# Patient Record
Sex: MALE | Race: WHITE | Employment: OTHER | URBAN - METROPOLITAN AREA
[De-identification: names, ages, dates, MRNs, and addresses within clinical notes are randomized per-mention and may not be internally consistent; named-entity substitution may affect disease eponyms.]

---

## 2017-03-06 PROCEDURE — 82043 UR ALBUMIN QUANTITATIVE: CPT | Performed by: INTERNAL MEDICINE

## 2017-03-06 PROCEDURE — 82570 ASSAY OF URINE CREATININE: CPT | Performed by: INTERNAL MEDICINE

## 2017-04-20 PROCEDURE — 87493 C DIFF AMPLIFIED PROBE: CPT | Performed by: INTERNAL MEDICINE

## 2017-04-20 PROCEDURE — 87046 STOOL CULTR AEROBIC BACT EA: CPT | Performed by: INTERNAL MEDICINE

## 2017-04-20 PROCEDURE — 87427 SHIGA-LIKE TOXIN AG IA: CPT | Performed by: INTERNAL MEDICINE

## 2017-04-20 PROCEDURE — 87269 GIARDIA AG IF: CPT | Performed by: INTERNAL MEDICINE

## 2017-04-20 PROCEDURE — 87045 FECES CULTURE AEROBIC BACT: CPT | Performed by: INTERNAL MEDICINE

## 2017-04-20 PROCEDURE — 87177 OVA AND PARASITES SMEARS: CPT | Performed by: INTERNAL MEDICINE

## 2017-04-20 PROCEDURE — 87015 SPECIMEN INFECT AGNT CONCNTJ: CPT | Performed by: INTERNAL MEDICINE

## 2017-04-20 PROCEDURE — 87209 SMEAR COMPLEX STAIN: CPT | Performed by: INTERNAL MEDICINE

## 2017-04-24 PROCEDURE — 86334 IMMUNOFIX E-PHORESIS SERUM: CPT | Performed by: PHYSICIAN ASSISTANT

## 2017-04-24 PROCEDURE — 84165 PROTEIN E-PHORESIS SERUM: CPT | Performed by: PHYSICIAN ASSISTANT

## 2017-04-24 PROCEDURE — 82784 ASSAY IGA/IGD/IGG/IGM EACH: CPT | Performed by: PHYSICIAN ASSISTANT

## 2017-04-24 PROCEDURE — 83883 ASSAY NEPHELOMETRY NOT SPEC: CPT | Performed by: PHYSICIAN ASSISTANT

## 2017-07-06 ENCOUNTER — TELEPHONE (OUTPATIENT)
Dept: RADIATION ONCOLOGY | Facility: HOSPITAL | Age: 82
End: 2017-07-06

## 2017-07-06 NOTE — TELEPHONE ENCOUNTER
Contacted Dr. Crum Modest office to inform them multiple attempts have been made to contact Mr. Licea with messages left to call the [facility] to schedule a consult with Dr. Micah Schmitt without success.   Informed office no further attempts would be made to

## 2017-08-01 ENCOUNTER — APPOINTMENT (OUTPATIENT)
Dept: RADIATION ONCOLOGY | Facility: HOSPITAL | Age: 82
End: 2017-08-01
Attending: RADIOLOGY
Payer: MEDICARE

## 2017-08-03 ENCOUNTER — OFFICE VISIT (OUTPATIENT)
Dept: RADIATION ONCOLOGY | Facility: HOSPITAL | Age: 82
End: 2017-08-03
Attending: RADIOLOGY
Payer: MEDICARE

## 2017-08-03 VITALS
DIASTOLIC BLOOD PRESSURE: 63 MMHG | SYSTOLIC BLOOD PRESSURE: 129 MMHG | RESPIRATION RATE: 18 BRPM | HEIGHT: 68 IN | WEIGHT: 207.63 LBS | BODY MASS INDEX: 31.47 KG/M2 | HEART RATE: 66 BPM

## 2017-08-03 PROCEDURE — 99212 OFFICE O/P EST SF 10 MIN: CPT

## 2017-08-03 RX ORDER — FENTANYL 25 UG/H
1 PATCH TRANSDERMAL
COMMUNITY
End: 2019-01-01

## 2017-08-03 RX ORDER — HYDROCODONE BITARTRATE AND ACETAMINOPHEN 5; 325 MG/1; MG/1
1 TABLET ORAL EVERY 4 HOURS PRN
COMMUNITY
End: 2019-01-01 | Stop reason: ALTCHOICE

## 2017-08-03 NOTE — PROGRESS NOTES
Nursing Consultation Note  Patient: Hawa Mccarthy  YOB: 1935  Age: 80year old  Radiation Oncologist: Dr. Azalia Méndez  Referring Physician: Ayaan Flores  Diagnosis:No diagnosis found.   Consult Date: 8/3/2017      Chemotherapy: no  Labs: Rfl: 2   GABAPENTIN 600 MG Oral Tab TAKE 2 TABLETS BY MOUTH IN THE MORNING THEN TAKE ONE TABLET IN THE AFTERNOON THEN TAKE 2 TABLETS AT DINNER TIME THEN TAKE ONE TABLET AT BEDT Disp: 360 tablet Rfl: 2   Nortriptyline HCl 25 MG Oral Cap TAKE TWO CAPSULES BY Disp:  Rfl:        Preferred Pharmacy:    [facility] [address] [phone], [phone]  [address]   [address] [place] [address]  Phone: [phone] Fax: [phone]    [facility] [address], [address]  No date: CATARACT  No date: CHOLECYSTECTOMY  7/9/2004: COLONOSCOPY      Comment: UGI 4/16/07 8/5/2014: COLONOSCOPY,BIOPSY N/A      Comment: Procedure: COLONOSCOPY, POSSIBLE BIOPSY,                POSSIBLE POLYPECTOMY 70549;  Surgeon: Leeroy Garcia use Yes  3.6 oz/week    6 Standard drinks or equivalent per week         Comment: weekends fri-sun; martini or [place]    Drug use: No    Sexual activity: Not on file     Other Topics Concern   None on file     Social History Narrative    Retired.  Bayron Valverde

## 2017-08-03 NOTE — PROGRESS NOTES
RADIATION ONCOLOGY NOTE    DATE OF VISIT: 8/3/2017    DIAGNOSIS :  79 yo with Metastatic castration resistant prostate cancer, currently in hospice. Dear Tom King and colleagues,    Thank you for asking us to see [facility].   He presents today after severa total) by mouth every morning. Disp: 90 capsule Rfl: 3   Oxybutynin Chloride ER 5 MG Oral Tablet 24 Hr Take 1 tablet (5 mg total) by mouth once daily.  Disp: 90 tablet Rfl: 3   tamsulosin HCl 0.4 MG Oral Cap Take 1 capsule (0.4 mg total) by mouth once daily of malignant neoplasm (2004); OTHER DISEASES; Other vitamin B12 deficiency anemia (7/7/2008); PARESTHESIA LEGS AND FEET (12/11/2008); Peripheral Neuropathy (6/7/2010); Personal history of malignant neoplasm of prostate (7/7/2008);  [facility] S/ surgery). PAST SOCIAL HISTORY   reports that he quit smoking about 44 years ago. He has a 12.00 pack-year smoking history. He has never used smokeless tobacco. He reports that he drinks about 3.6 oz of alcohol per week .  He reports that he does not use castration resistant prostate cancer, currently in hospice and thus has not had recent PSAs/ imaging.     I reviewed his previous scans with pt and wife and he is taking 2 norco a day along with his long acting pain patch for generalized bone pain in the sh

## 2017-10-15 ENCOUNTER — HOSPITAL ENCOUNTER (INPATIENT)
Facility: HOSPITAL | Age: 82
LOS: 5 days | Discharge: NURSING, REHAB OR LONG-TERM CARE | DRG: 481 | End: 2017-10-20
Attending: EMERGENCY MEDICINE | Admitting: INTERNAL MEDICINE
Payer: MEDICARE

## 2017-10-15 ENCOUNTER — APPOINTMENT (OUTPATIENT)
Dept: GENERAL RADIOLOGY | Facility: HOSPITAL | Age: 82
DRG: 481 | End: 2017-10-15
Attending: EMERGENCY MEDICINE
Payer: MEDICARE

## 2017-10-15 ENCOUNTER — ANESTHESIA EVENT (OUTPATIENT)
Dept: SURGERY | Facility: HOSPITAL | Age: 82
DRG: 481 | End: 2017-10-15
Payer: MEDICARE

## 2017-10-15 ENCOUNTER — APPOINTMENT (OUTPATIENT)
Dept: CT IMAGING | Facility: HOSPITAL | Age: 82
DRG: 481 | End: 2017-10-15
Attending: INTERNAL MEDICINE
Payer: MEDICARE

## 2017-10-15 DIAGNOSIS — S72.001A CLOSED FRACTURE OF RIGHT HIP REQUIRING OPERATIVE REPAIR, INITIAL ENCOUNTER (HCC): Primary | ICD-10-CM

## 2017-10-15 PROCEDURE — 96374 THER/PROPH/DIAG INJ IV PUSH: CPT

## 2017-10-15 PROCEDURE — 64447 NJX AA&/STRD FEMORAL NRV IMG: CPT

## 2017-10-15 PROCEDURE — 76942 ECHO GUIDE FOR BIOPSY: CPT

## 2017-10-15 PROCEDURE — 85025 COMPLETE CBC W/AUTO DIFF WBC: CPT | Performed by: EMERGENCY MEDICINE

## 2017-10-15 PROCEDURE — 87640 STAPH A DNA AMP PROBE: CPT | Performed by: EMERGENCY MEDICINE

## 2017-10-15 PROCEDURE — 87641 MR-STAPH DNA AMP PROBE: CPT | Performed by: EMERGENCY MEDICINE

## 2017-10-15 PROCEDURE — 80053 COMPREHEN METABOLIC PANEL: CPT | Performed by: EMERGENCY MEDICINE

## 2017-10-15 PROCEDURE — 99285 EMERGENCY DEPT VISIT HI MDM: CPT

## 2017-10-15 PROCEDURE — 73502 X-RAY EXAM HIP UNI 2-3 VIEWS: CPT | Performed by: EMERGENCY MEDICINE

## 2017-10-15 PROCEDURE — 96375 TX/PRO/DX INJ NEW DRUG ADDON: CPT

## 2017-10-15 PROCEDURE — 81003 URINALYSIS AUTO W/O SCOPE: CPT | Performed by: INTERNAL MEDICINE

## 2017-10-15 PROCEDURE — 93005 ELECTROCARDIOGRAM TRACING: CPT

## 2017-10-15 PROCEDURE — 70450 CT HEAD/BRAIN W/O DYE: CPT | Performed by: INTERNAL MEDICINE

## 2017-10-15 PROCEDURE — 86850 RBC ANTIBODY SCREEN: CPT | Performed by: EMERGENCY MEDICINE

## 2017-10-15 PROCEDURE — 93010 ELECTROCARDIOGRAM REPORT: CPT

## 2017-10-15 PROCEDURE — 86900 BLOOD TYPING SEROLOGIC ABO: CPT | Performed by: EMERGENCY MEDICINE

## 2017-10-15 PROCEDURE — 71010 XR CHEST AP PORTABLE  (CPT=71010): CPT | Performed by: EMERGENCY MEDICINE

## 2017-10-15 PROCEDURE — 86901 BLOOD TYPING SEROLOGIC RH(D): CPT | Performed by: EMERGENCY MEDICINE

## 2017-10-15 PROCEDURE — 73552 X-RAY EXAM OF FEMUR 2/>: CPT | Performed by: EMERGENCY MEDICINE

## 2017-10-15 RX ORDER — DOCUSATE SODIUM 100 MG/1
100 CAPSULE, LIQUID FILLED ORAL 2 TIMES DAILY PRN
Status: DISCONTINUED | OUTPATIENT
Start: 2017-10-15 | End: 2017-10-20

## 2017-10-15 RX ORDER — GABAPENTIN 600 MG/1
600 TABLET ORAL 2 TIMES DAILY
Status: DISCONTINUED | OUTPATIENT
Start: 2017-10-15 | End: 2017-10-20

## 2017-10-15 RX ORDER — 0.9 % SODIUM CHLORIDE 0.9 %
10 VIAL (ML) INJECTION AS NEEDED
Status: DISCONTINUED | OUTPATIENT
Start: 2017-10-15 | End: 2017-10-20

## 2017-10-15 RX ORDER — SODIUM CHLORIDE 9 MG/ML
INJECTION, SOLUTION INTRAVENOUS CONTINUOUS
Status: ACTIVE | OUTPATIENT
Start: 2017-10-15 | End: 2017-10-15

## 2017-10-15 RX ORDER — HYDROMORPHONE HYDROCHLORIDE 1 MG/ML
INJECTION, SOLUTION INTRAMUSCULAR; INTRAVENOUS; SUBCUTANEOUS
Status: DISPENSED
Start: 2017-10-15 | End: 2017-10-15

## 2017-10-15 RX ORDER — OXYBUTYNIN CHLORIDE 5 MG/1
5 TABLET, EXTENDED RELEASE ORAL
Status: DISCONTINUED | OUTPATIENT
Start: 2017-10-15 | End: 2017-10-15 | Stop reason: DRUGHIGH

## 2017-10-15 RX ORDER — FENTANYL 25 UG/H
1 PATCH TRANSDERMAL
Status: DISCONTINUED | OUTPATIENT
Start: 2017-10-15 | End: 2017-10-20

## 2017-10-15 RX ORDER — NORTRIPTYLINE HYDROCHLORIDE 25 MG/1
25 CAPSULE ORAL NIGHTLY
Status: DISCONTINUED | OUTPATIENT
Start: 2017-10-15 | End: 2017-10-20

## 2017-10-15 RX ORDER — ROPIVACAINE HYDROCHLORIDE 5 MG/ML
30 INJECTION, SOLUTION EPIDURAL; INFILTRATION; PERINEURAL AS NEEDED
Status: DISCONTINUED | OUTPATIENT
Start: 2017-10-15 | End: 2017-10-20

## 2017-10-15 RX ORDER — HYDROMORPHONE HYDROCHLORIDE 1 MG/ML
1 INJECTION, SOLUTION INTRAMUSCULAR; INTRAVENOUS; SUBCUTANEOUS ONCE
Status: COMPLETED | OUTPATIENT
Start: 2017-10-15 | End: 2017-10-15

## 2017-10-15 RX ORDER — HYDROMORPHONE HYDROCHLORIDE 1 MG/ML
0.5 INJECTION, SOLUTION INTRAMUSCULAR; INTRAVENOUS; SUBCUTANEOUS EVERY 30 MIN PRN
Status: COMPLETED | OUTPATIENT
Start: 2017-10-15 | End: 2017-10-17

## 2017-10-15 RX ORDER — LIDOCAINE HYDROCHLORIDE 10 MG/ML
2 INJECTION, SOLUTION EPIDURAL; INFILTRATION; INTRACAUDAL; PERINEURAL AS NEEDED
Status: DISCONTINUED | OUTPATIENT
Start: 2017-10-15 | End: 2017-10-20

## 2017-10-15 RX ORDER — CHOLESTYRAMINE LIGHT 4 G/5.7G
4 POWDER, FOR SUSPENSION ORAL
Status: DISCONTINUED | OUTPATIENT
Start: 2017-10-15 | End: 2017-10-20

## 2017-10-15 RX ORDER — HYDROCODONE BITARTRATE AND ACETAMINOPHEN 5; 325 MG/1; MG/1
1 TABLET ORAL EVERY 4 HOURS PRN
Status: DISCONTINUED | OUTPATIENT
Start: 2017-10-15 | End: 2017-10-20

## 2017-10-15 RX ORDER — ONDANSETRON 2 MG/ML
4 INJECTION INTRAMUSCULAR; INTRAVENOUS EVERY 4 HOURS PRN
Status: DISCONTINUED | OUTPATIENT
Start: 2017-10-15 | End: 2017-10-20

## 2017-10-15 RX ORDER — METOPROLOL SUCCINATE 50 MG/1
50 TABLET, EXTENDED RELEASE ORAL
Status: DISCONTINUED | OUTPATIENT
Start: 2017-10-16 | End: 2017-10-20

## 2017-10-15 RX ORDER — ONDANSETRON 2 MG/ML
INJECTION INTRAMUSCULAR; INTRAVENOUS
Status: DISPENSED
Start: 2017-10-15 | End: 2017-10-15

## 2017-10-15 RX ORDER — ONDANSETRON 2 MG/ML
4 INJECTION INTRAMUSCULAR; INTRAVENOUS ONCE
Status: COMPLETED | OUTPATIENT
Start: 2017-10-15 | End: 2017-10-15

## 2017-10-15 RX ORDER — ONDANSETRON 2 MG/ML
4 INJECTION INTRAMUSCULAR; INTRAVENOUS ONCE
Status: DISCONTINUED | OUTPATIENT
Start: 2017-10-15 | End: 2017-10-15

## 2017-10-15 RX ORDER — OXYBUTYNIN CHLORIDE 10 MG/1
10 TABLET, EXTENDED RELEASE ORAL DAILY
Status: DISCONTINUED | OUTPATIENT
Start: 2017-10-15 | End: 2017-10-20

## 2017-10-15 RX ORDER — PANTOPRAZOLE SODIUM 20 MG/1
20 TABLET, DELAYED RELEASE ORAL
Status: DISCONTINUED | OUTPATIENT
Start: 2017-10-16 | End: 2017-10-20

## 2017-10-15 RX ORDER — FENTANYL 25 UG/H
1 PATCH TRANSDERMAL
Status: DISCONTINUED | OUTPATIENT
Start: 2017-10-15 | End: 2017-10-15

## 2017-10-15 RX ORDER — ALFUZOSIN HYDROCHLORIDE 10 MG/1
10 TABLET, EXTENDED RELEASE ORAL
Status: DISCONTINUED | OUTPATIENT
Start: 2017-10-16 | End: 2017-10-20

## 2017-10-15 NOTE — PAYOR COMM NOTE
--------------  ADMISSION REVIEW       10/15    ED       Patient presents with:  Trauma   Fall     Stated Complaint: unwittnessed fall     HPI     This is a 17-year-old male who states that he was sweeping the floor and he states that he fell.   .  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 WAS IN                [facility]  No date: OTHER SURGICAL HISTORY      Comment: Gilda Mild surrounding soft tissue swelling. EFFUSION:  None visible. OTHER:  Negative.       CONCLUSION:  1. Periprosthetic fracture along the mid shaft of the right femur at the level of the distal tip of the femoral stem of the right hip prosthesis.  There is

## 2017-10-15 NOTE — ED NOTES
Transport arrives to ER to transport pt up to floor. Pt able to move right foot. +CMS.  No distress noted prior to transport

## 2017-10-15 NOTE — PROGRESS NOTES
[phone] hospice care supervisor brigitte martínez. Pt signed off hospice now in order to get hip surgery. Copy of sing off release form from pts hospice rn in chart, yellow in color.

## 2017-10-15 NOTE — ED INITIAL ASSESSMENT (HPI)
Unwitnessed fall at home. He denies hitting his head. Complains of pain to right leg. His right leg is short and rotated. He has some pain to his right shoulder. Denies hitting his head.  Denies becoming dizzy or lightheaded before the fall, however, says t

## 2017-10-15 NOTE — ED PROVIDER NOTES
Patient Seen in: [facility] Emergency Department    History   Patient presents with:  Trauma (cardiovascular, musculoskeletal)  Fall (musculoskeletal, neurologic)    Stated Complaint: unwittnessed fall    HPI    This is a 17-year-old male who states OTHER DISEASES     Neuropathy   • Other vitamin B12 deficiency anemia 7/7/2008   • PARESTHESIA LEGS AND FEET 12/11/2008   • Peripheral Neuropathy 6/7/2010    uses cane   • Personal history of malignant neoplasm of prostate 7/7/2008   • Prostate cancer ([facility] [address] AFTER THAT. WAS IN                [facility]  No date: OTHER SURGICAL HISTORY      Comment: Bilateral foot surgery  No date: OTHER SURGICAL HISTORY      Comment: Right knee surgery  5/1/13: SKIN SURGERY      Comment: [facility] to the L lower prer Atraumatic, conjunctiva are not pale. There is no icterus. Oral mucosa Is wet. No facial trauma. The neck is supple. LUNGS: Clear to auscultation, there is no wheezing or retraction. No crackles.     CV: Cardiovascular is regular without murmurs or Please view results for these tests on the individual orders. TYPE AND SCREEN    Narrative: The following orders were created for panel order TYPE AND SCREEN.   Procedure                               Abnormality         Status of the distal tip of the femoral stem of the right hip prosthesis. There is superior and medial displacement of the distal fracture fragment. Dictated by: Bharathi Jordan DO on 10/15/2017 at 9:15     Approved by:  Bharathi Jordan DO            Xr Chest Ap Po normal limits.   The patient's case was discussed with Dr. Refugio Richardson from orthopedics, the patient's case was also discussed with the [facility] hospitalist Dr. Deutsch Patient I have also discussed this case with the anesthesiologist who did do a femoral block

## 2017-10-15 NOTE — ED NOTES
Round on pt. +CMS to right leg. Pt moving foot. Pt updated on eta to transport to floor. No distress noted.  Pt denies any needs

## 2017-10-15 NOTE — ED NOTES
Pt to ED post fall. Pt sts he was sweeping and doesn't remember falling. Denies hitting head. C-spine cleared in field. A/Ox3. Pt c/o R hip pain. R leg externally rotated. +CMS. Pt sts spouse is on her way. Pt approves to cut off shorts. Pt updated on poc.

## 2017-10-15 NOTE — PLAN OF CARE
Pt is A&Ox4, with some forgetfulness, pt unable to recall today's events, he stating he doesn't know what he was doing last night or this morning, but knows where he is & that he broke his hip. lacy rico informed, ct scan per order, pt in now currently down fo

## 2017-10-15 NOTE — ANESTHESIA PREPROCEDURE EVALUATION
PRE-OP EVALUATION    Patient Name: Carlos Brady    Pre-op Diagnosis: Olivia-prosthetic femoral shaft fracture [V86. Rona Standing    Procedure(s):  O.R.I. F.  periprosthetic fracture along the mid shaft of the right femur at the level of the distal ti depression                              Past Surgical History:  1/29/10: ADJ TISS XFER HEAD,FAC,HAND <10SQCM      Comment: Performed by Fito Carroll at [facility]  No date: CATARACT  No date: CHOLECYSTECTOMY  7/9/2004: CO 3.6 oz/week    6 Standard drinks or equivalent per week         Comment: weekends fri-sun; martini or [place]       Drug use: No     Available pre-op labs reviewed.     Lab Results  Component Value Date   WBC 8.1 10/15/2017   WBC 6.19 09/05/2017   RBC 3.8

## 2017-10-16 ENCOUNTER — SURGERY (OUTPATIENT)
Age: 82
End: 2017-10-16

## 2017-10-16 ENCOUNTER — APPOINTMENT (OUTPATIENT)
Dept: GENERAL RADIOLOGY | Facility: HOSPITAL | Age: 82
DRG: 481 | End: 2017-10-16
Attending: COUNSELOR
Payer: MEDICARE

## 2017-10-16 ENCOUNTER — ANESTHESIA (OUTPATIENT)
Dept: SURGERY | Facility: HOSPITAL | Age: 82
DRG: 481 | End: 2017-10-16
Payer: MEDICARE

## 2017-10-16 PROCEDURE — 73551 X-RAY EXAM OF FEMUR 1: CPT | Performed by: COUNSELOR

## 2017-10-16 PROCEDURE — 3E0T3BZ INTRODUCTION OF ANESTHETIC AGENT INTO PERIPHERAL NERVES AND PLEXI, PERCUTANEOUS APPROACH: ICD-10-PCS | Performed by: ANESTHESIOLOGY

## 2017-10-16 PROCEDURE — 76942 ECHO GUIDE FOR BIOPSY: CPT | Performed by: ORTHOPAEDIC SURGERY

## 2017-10-16 PROCEDURE — 82962 GLUCOSE BLOOD TEST: CPT

## 2017-10-16 PROCEDURE — 0QS804Z REPOSITION RIGHT FEMORAL SHAFT WITH INTERNAL FIXATION DEVICE, OPEN APPROACH: ICD-10-PCS | Performed by: ORTHOPAEDIC SURGERY

## 2017-10-16 DEVICE — CABLE READY ASSEMBLY 1.8X635: Type: IMPLANTABLE DEVICE | Site: FEMUR | Status: FUNCTIONAL

## 2017-10-16 RX ORDER — DEXTROSE AND SODIUM CHLORIDE 5; .45 G/100ML; G/100ML
INJECTION, SOLUTION INTRAVENOUS CONTINUOUS
Status: DISCONTINUED | OUTPATIENT
Start: 2017-10-16 | End: 2017-10-19

## 2017-10-16 RX ORDER — BISACODYL 10 MG
10 SUPPOSITORY, RECTAL RECTAL
Status: DISCONTINUED | OUTPATIENT
Start: 2017-10-16 | End: 2017-10-20

## 2017-10-16 RX ORDER — DEXTROSE MONOHYDRATE 25 G/50ML
50 INJECTION, SOLUTION INTRAVENOUS
Status: DISCONTINUED | OUTPATIENT
Start: 2017-10-16 | End: 2017-10-16 | Stop reason: HOSPADM

## 2017-10-16 RX ORDER — HYDROCODONE BITARTRATE AND ACETAMINOPHEN 5; 325 MG/1; MG/1
1 TABLET ORAL AS NEEDED
Status: DISCONTINUED | OUTPATIENT
Start: 2017-10-16 | End: 2017-10-16 | Stop reason: HOSPADM

## 2017-10-16 RX ORDER — DIPHENHYDRAMINE HYDROCHLORIDE 50 MG/ML
12.5 INJECTION INTRAMUSCULAR; INTRAVENOUS AS NEEDED
Status: DISCONTINUED | OUTPATIENT
Start: 2017-10-16 | End: 2017-10-16 | Stop reason: HOSPADM

## 2017-10-16 RX ORDER — MEPERIDINE HYDROCHLORIDE 25 MG/ML
12.5 INJECTION INTRAMUSCULAR; INTRAVENOUS; SUBCUTANEOUS AS NEEDED
Status: DISCONTINUED | OUTPATIENT
Start: 2017-10-16 | End: 2017-10-16 | Stop reason: HOSPADM

## 2017-10-16 RX ORDER — ASPIRIN 325 MG
325 TABLET ORAL 2 TIMES DAILY
Status: DISCONTINUED | OUTPATIENT
Start: 2017-10-16 | End: 2017-10-20

## 2017-10-16 RX ORDER — SODIUM CHLORIDE 9 MG/ML
INJECTION, SOLUTION INTRAVENOUS CONTINUOUS
Status: DISCONTINUED | OUTPATIENT
Start: 2017-10-16 | End: 2017-10-19

## 2017-10-16 RX ORDER — POLYETHYLENE GLYCOL 3350 17 G/17G
17 POWDER, FOR SOLUTION ORAL DAILY PRN
Status: DISCONTINUED | OUTPATIENT
Start: 2017-10-16 | End: 2017-10-20

## 2017-10-16 RX ORDER — DOCUSATE SODIUM 100 MG/1
100 CAPSULE, LIQUID FILLED ORAL 2 TIMES DAILY
Status: DISCONTINUED | OUTPATIENT
Start: 2017-10-16 | End: 2017-10-20

## 2017-10-16 RX ORDER — ONDANSETRON 2 MG/ML
4 INJECTION INTRAMUSCULAR; INTRAVENOUS AS NEEDED
Status: DISCONTINUED | OUTPATIENT
Start: 2017-10-16 | End: 2017-10-16 | Stop reason: HOSPADM

## 2017-10-16 RX ORDER — ASPIRIN 325 MG
650 TABLET ORAL NIGHTLY
Status: DISCONTINUED | OUTPATIENT
Start: 2017-10-16 | End: 2017-10-16

## 2017-10-16 RX ORDER — MIDAZOLAM HYDROCHLORIDE 1 MG/ML
INJECTION INTRAMUSCULAR; INTRAVENOUS
Status: COMPLETED
Start: 2017-10-16 | End: 2017-10-16

## 2017-10-16 RX ORDER — NALOXONE HYDROCHLORIDE 0.4 MG/ML
80 INJECTION, SOLUTION INTRAMUSCULAR; INTRAVENOUS; SUBCUTANEOUS AS NEEDED
Status: DISCONTINUED | OUTPATIENT
Start: 2017-10-16 | End: 2017-10-16 | Stop reason: HOSPADM

## 2017-10-16 RX ORDER — ACETAMINOPHEN 500 MG
1000 TABLET ORAL ONCE AS NEEDED
Status: DISCONTINUED | OUTPATIENT
Start: 2017-10-16 | End: 2017-10-16 | Stop reason: HOSPADM

## 2017-10-16 RX ORDER — SODIUM PHOSPHATE, DIBASIC AND SODIUM PHOSPHATE, MONOBASIC 7; 19 G/133ML; G/133ML
1 ENEMA RECTAL ONCE AS NEEDED
Status: DISCONTINUED | OUTPATIENT
Start: 2017-10-16 | End: 2017-10-20

## 2017-10-16 RX ORDER — SODIUM CHLORIDE, SODIUM LACTATE, POTASSIUM CHLORIDE, CALCIUM CHLORIDE 600; 310; 30; 20 MG/100ML; MG/100ML; MG/100ML; MG/100ML
INJECTION, SOLUTION INTRAVENOUS CONTINUOUS
Status: DISCONTINUED | OUTPATIENT
Start: 2017-10-16 | End: 2017-10-19

## 2017-10-16 RX ORDER — HYDROCODONE BITARTRATE AND ACETAMINOPHEN 5; 325 MG/1; MG/1
2 TABLET ORAL AS NEEDED
Status: DISCONTINUED | OUTPATIENT
Start: 2017-10-16 | End: 2017-10-16 | Stop reason: HOSPADM

## 2017-10-16 RX ORDER — BICALUTAMIDE 50 MG/1
50 TABLET, FILM COATED ORAL
Status: DISCONTINUED | OUTPATIENT
Start: 2017-10-17 | End: 2017-10-20

## 2017-10-16 RX ORDER — MIDAZOLAM HYDROCHLORIDE 1 MG/ML
1 INJECTION INTRAMUSCULAR; INTRAVENOUS EVERY 5 MIN PRN
Status: DISCONTINUED | OUTPATIENT
Start: 2017-10-16 | End: 2017-10-16 | Stop reason: HOSPADM

## 2017-10-16 RX ORDER — HYDROMORPHONE HYDROCHLORIDE 1 MG/ML
0.4 INJECTION, SOLUTION INTRAMUSCULAR; INTRAVENOUS; SUBCUTANEOUS EVERY 5 MIN PRN
Status: DISCONTINUED | OUTPATIENT
Start: 2017-10-16 | End: 2017-10-16 | Stop reason: HOSPADM

## 2017-10-16 NOTE — PLAN OF CARE
PAIN - ADULT    • Verbalizes/displays adequate comfort level or patient's stated pain goal Progressing        SAFETY ADULT - FALL    • Free from fall injury Progressing        Patient is alert and occassionally confused.  Patient is able to follow commands

## 2017-10-16 NOTE — PROGRESS NOTES
Ortho Note    Status Post Nerve Block:  Type of Nerve Block: Right Femoral  Single Injection Nerve Block    Patient received nerve block in ER for right femur fracture : No evidence of immediate block related complications, No paresthesia noted and Patient

## 2017-10-16 NOTE — PROGRESS NOTES
[facility] Hospitalist Progress Note     PCP: Alex Aguirre MD    Chief Complaint: follow-up    Overnight/Interim Events:      SUBJECTIVE:  Pt laying in bed. Oriented to location and date although said year was 1971. Wife worried about PM surgery.      OBJECTIVE HYDROmorphone HCl PF, Lidocaine HCl (PF), ropivacaine HCl, EPINEPHrine HCl, Sodium Chloride, ondansetron HCl, influenza virus vaccine PF, docusate sodium, bisacodyl, cholestyramine light, HYDROcodone-acetaminophen       Assessment/Plan:     80year old

## 2017-10-16 NOTE — H&P
General Medicine H&P     Pt seen and examined 10/15/2017    Patient presents with:  Trauma (cardiovascular, musculoskeletal)  Fall (musculoskeletal, neurologic)       PCP: Jayde Lau MD    History of Present Illness: Patient is a 80year old male with P diabetes mellitus without mention of complication, not stated as uncontrolled    • Unspecified essential hypertension    • Unspecified vitamin D deficiency 1/19/2009      Past Surgical History:  1/29/10: ADJ TISS XFER HEAD,FAC,HAND <10SQCM      Comment: Pe 1 patch Q72H   Loteprednol-Tobramycin 2 drop 6x Daily   [START ON 10/16/2017] Pantoprazole Sodium 20 mg QAM AC   Oxybutynin Chloride ER 10 mg Daily   [START ON 10/16/2017] Metoprolol Succinate ER 50 mg Daily Beta Blocker   Nortriptyline HCl 25 mg Nightly transcribed by Technologist)  Patient offered no additional history at this time.     FINDINGS:  BONES:  There is a periprosthetic fracture along the mid shaft of the right femur at the level of the distal tips of the femoral stem of the right hip prosthesi disease is noted. No evidence of intracranial hemorrhage or extra-axial fluid collection.    Dictated by: Bertin Sanches MD on 10/15/2017 at 16:22     Approved by: Bertin Sanches MD            Xr Chest Ap Portable  (cpt=71010)    Result Date: 10/15/ fx.    Fall  -unclear, mechanical? Wife reports more confused  -CT head done, chronic changes noted    Femur fx  -ortho c/s, plan for surgery  -CMP, CBC, UA ok  -MSSA on screen  -CXR clear  -EKG sinus jose armando  -medically ok for necessary surgery    HTN  -BB

## 2017-10-16 NOTE — PLAN OF CARE
Pt's spouse and family expressed concerns over late OR time. Spouse requesting to speak to DR. Suhail Maki. Heidy MAYS, awaiting response. Will continue to monitor. 1515: Dr. Suhail Maki discussed OR time with spouse. OK for OR tonight. Will continue to monitor.

## 2017-10-16 NOTE — CONSULTS
[address]    PATIENT'S NAME: Genny Mansfield   ATTENDING PHYSICIAN: Lavinia Lopez. LAURA López Mustache: Sheryl Núñez M.D.    PATIENT ACCOUNT#:   [de-identified]    LOCATION:  [address] [place] [address]  MEDICAL RECORD #:   [id number]       DATE drinks 6 alcoholic drinks per week. REVIEW OF SYSTEMS:  Negative. PHYSICAL EXAMINATION:    GENERAL:  The patient is alert and oriented. VITAL SIGNS:  Height 5 feet 8 inches, weighs 99 kg, for a BMI of 33.45. Vital signs stable.   Temperature 97.2,

## 2017-10-16 NOTE — PLAN OF CARE
PAIN - ADULT    • Verbalizes/displays adequate comfort level or patient's stated pain goal Progressing        SAFETY ADULT - FALL    • Free from fall injury Progressing          Pt pain controlled on IV Dilaudid. NPO for OR later today.  Spouse to sign cons

## 2017-10-16 NOTE — PHYSICAL THERAPY NOTE
PT orders recd. Surgery scheduled for today, will dc current orders and will need new orders when appropriate.

## 2017-10-16 NOTE — CM/SW NOTE
10/16/17 1600   CM/SW Referral Data   Referral Source Physician;Social Work (self-referral)   Reason for Referral Discharge planning   Informant Patient   Pertinent Medical Hx   Primary Care Physician Name [facility]   Patient Info   Patient's Mental Status

## 2017-10-17 PROCEDURE — 97162 PT EVAL MOD COMPLEX 30 MIN: CPT

## 2017-10-17 PROCEDURE — 85018 HEMOGLOBIN: CPT | Performed by: ORTHOPAEDIC SURGERY

## 2017-10-17 PROCEDURE — 97530 THERAPEUTIC ACTIVITIES: CPT

## 2017-10-17 NOTE — PROGRESS NOTES
[facility] Hospitalist Progress Note     PCP: Shy Balderrama MD    SUBJECTIVE:  No CP, SOB, or N/V.    -s/p ORIF with MTP plate and screws on 50/76/92  Minimal pain this am but states controlled with pain meds.     OBJECTIVE:  Temp:  [97.3 °F (36.3 °C)-100 °F (37 sodium chloride 100 mL/hr at 10/16/17 1615   • lactated ringers     • Dextrose-NaCl 83 mL/hr at 10/16/17 2124     PEG 3350, magnesium hydroxide, bisacodyl, FLEET ENEMA, Lidocaine HCl (PF), ropivacaine HCl, EPINEPHrine HCl, Sodium Chloride, ondansetron HCl,

## 2017-10-17 NOTE — PROGRESS NOTES
Post Op Day 1 Ortho Note    Status Post Nerve Block:  Type of Nerve Block: Right Femoral  Single Injection Nerve Block    Post op review: No evidence of immediate block related complications   Patient is sleeping but arouses.  Slightly confused - at baselin

## 2017-10-17 NOTE — PHYSICAL THERAPY NOTE
PHYSICAL THERAPY EVALUATION - INPATIENT     Room Number: 358/358-A  Evaluation Date: 10/17/2017  Type of Evaluation: Initial  Physician Order: PT Eval and Treat    Presenting Problem: closed R hip fx s/p ORIF 10/16/17  Reason for Therapy: Mobility Dysf DISEASES     Neuropathy   • Other vitamin B12 deficiency anemia 7/7/2008   • PARESTHESIA LEGS AND FEET 12/11/2008   • Peripheral Neuropathy 6/7/2010    uses cane   • Personal history of malignant neoplasm of prostate 7/7/2008   • Prostate cancer [facility])    • BRAIN HEMORRHAGE AFTER THAT. WAS IN                [address] [place] [address]  No date: OTHER SURGICAL HISTORY      Comment: Bilateral foot surgery  No date: OTHER SURGICAL HISTORY      Comment: Right knee surgery  5/1/13: SKIN SURGERY      Comment: [facility] to anais assistance and decreased awareness of need for safety    RANGE OF MOTION AND STRENGTH ASSESSMENT  Upper extremity ROM and strength are within functional limits except R shld painful with mobility*    Lower extremity ROM is within functional limits for LLE; post hip precautions. Pt required intermittent verbal and tactile cues to participate and follow commands. Pt required knee immobilizer d/t mod impaired quad function in supine. Supine/sit c mod A for RLE and trunk.  Pt required min A to scoot to the EOB sa gait.  The patient is below baseline and would benefit from skilled inpatient PT to address the above deficits to assist patient in returning to prior to level of function.  Pt was recently released from hospice care in his home and family is motivated to a

## 2017-10-17 NOTE — CM/SW NOTE
ECIN response from [facility] that they have financial authorization from insurance.

## 2017-10-17 NOTE — OPERATIVE REPORT
[facility]    PATIENT'S NAME: Atul Carpio   ATTENDING PHYSICIAN: Karen Winkler D.O.   OPERATING PHYSICIAN: Corby Clayton M.D.    PATIENT ACCOUNT#:   [de-identified]    LOCATION:  [facility]  MEDICAL RECORD #:   [id number]       DATE OF BIR out to length. We rotated the fracture fragment to reduce the fracture in anatomic alignment. We then fixed the fracture with an NCB plate. We placed the plate over the lateral aspect of the femur. We secured the plate with a series of cortical screws. There were no complications. He went to the recovery area in stable condition. Intraoperative findings were discussed with the patient's wife and son, and postoperative instructions were written.     Dictated By Radha Hernandez M.D.  d: 10/16/2017 20:58:39

## 2017-10-17 NOTE — BRIEF OP NOTE
Pre-Operative Diagnosis: Olivia-prosthetic femoral shaft fracture [id number]. 8XXA, Z96.649]     Post-Operative Diagnosis: * No post-op diagnosis entered *     Procedure Performed:   Procedure(s):  O.R.I. F.  periprosthetic fracture along the mid shaft of the righ

## 2017-10-17 NOTE — ANESTHESIA POSTPROCEDURE EVALUATION
[facility]    Prabhjot Patricio Patient Status:  Inpatient   Age/Gender 80year old male MRN [id number]   Location [address] Attending Pamella Artis MD   Hosp Day # 1 PCP Flor Saenz MD       Anesthesia Post

## 2017-10-18 PROCEDURE — 85018 HEMOGLOBIN: CPT | Performed by: HOSPITALIST

## 2017-10-18 PROCEDURE — 85018 HEMOGLOBIN: CPT | Performed by: ORTHOPAEDIC SURGERY

## 2017-10-18 PROCEDURE — 97530 THERAPEUTIC ACTIVITIES: CPT

## 2017-10-18 PROCEDURE — 97110 THERAPEUTIC EXERCISES: CPT

## 2017-10-18 NOTE — PROGRESS NOTES
[facility] Hospitalist Progress Note     PCP: Bin Petit MD    SUBJECTIVE:  No CP, SOB, or N/V.    OBJECTIVE:  Temp:  [98 °F (36.7 °C)-98.7 °F (37.1 °C)] 98.3 °F (36.8 °C)  Pulse:  [62-75] 68  Resp:  [17-19] 17  BP: (105-122)/(44-56) 121/53    Intake/Output: bisacodyl, FLEET ENEMA, Lidocaine HCl (PF), ropivacaine HCl, EPINEPHrine HCl, Sodium Chloride, ondansetron HCl, influenza virus vaccine PF, docusate sodium, bisacodyl, cholestyramine light, HYDROcodone-acetaminophen       Assessment/Plan:     Principal Pro

## 2017-10-18 NOTE — PHYSICAL THERAPY NOTE
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 The-ex as noted below. Knee Immobilizer applied for OOB activity.   Supine to sit with re-educate;Range of motion;Strengthening;Transfer training;Balance training  Rehab Potential : Guarded  Frequency (Obs): Daily    CURRENT GOALS     Goal #1 Patient is able to demonstrate supine - sit EOB @ level: minimum assistance   Goal #2 Patient is abl

## 2017-10-18 NOTE — PAYOR COMM NOTE
--------------  CONTINUED STAY REVIEW      10/16  TO OR    OPERATIVE REPORT        PREOPERATIVE DIAGNOSIS:  Right periprosthetic femur fracture. POSTOPERATIVE DIAGNOSIS:  Right periprosthetic femur fracture.   PROCEDURE PERFORMED:  Open reduction, internal

## 2017-10-18 NOTE — CM/SW NOTE
DOUG met with the patient and his son Trae Hoskins at bedside. The patient and family would like the patient to be placed back on [facility] once he is out of [facility].   MSW informed [address] so this can be arranged once the pa

## 2017-10-19 PROCEDURE — 85018 HEMOGLOBIN: CPT | Performed by: HOSPITALIST

## 2017-10-19 PROCEDURE — 97530 THERAPEUTIC ACTIVITIES: CPT

## 2017-10-19 PROCEDURE — 85018 HEMOGLOBIN: CPT | Performed by: ORTHOPAEDIC SURGERY

## 2017-10-19 PROCEDURE — 36430 TRANSFUSION BLD/BLD COMPNT: CPT

## 2017-10-19 PROCEDURE — 97110 THERAPEUTIC EXERCISES: CPT

## 2017-10-19 PROCEDURE — 30233N1 TRANSFUSION OF NONAUTOLOGOUS RED BLOOD CELLS INTO PERIPHERAL VEIN, PERCUTANEOUS APPROACH: ICD-10-PCS | Performed by: HOSPITALIST

## 2017-10-19 PROCEDURE — 86900 BLOOD TYPING SEROLOGIC ABO: CPT | Performed by: HOSPITALIST

## 2017-10-19 PROCEDURE — 86920 COMPATIBILITY TEST SPIN: CPT

## 2017-10-19 PROCEDURE — 86850 RBC ANTIBODY SCREEN: CPT | Performed by: HOSPITALIST

## 2017-10-19 PROCEDURE — 86901 BLOOD TYPING SEROLOGIC RH(D): CPT | Performed by: HOSPITALIST

## 2017-10-19 RX ORDER — SODIUM CHLORIDE 9 MG/ML
INJECTION, SOLUTION INTRAVENOUS ONCE
Status: COMPLETED | OUTPATIENT
Start: 2017-10-19 | End: 2017-10-19

## 2017-10-19 RX ORDER — FUROSEMIDE 10 MG/ML
20 INJECTION INTRAMUSCULAR; INTRAVENOUS ONCE
Status: COMPLETED | OUTPATIENT
Start: 2017-10-19 | End: 2017-10-19

## 2017-10-19 NOTE — PHYSICAL THERAPY NOTE
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 C/o of pain with movement of left hip for there-ex.  Pt completes supine RECOMMENDATIONS  PT Discharge Recommendations: Sub-acute rehabilitation; Other (Comment) (c ELOS 19-21 days)     PLAN  PT Treatment Plan: Bed mobility; Body mechanics; Endurance; Patient education; Family education;Gait training;Neuromuscular re-educate;Range o

## 2017-10-19 NOTE — DISCHARGE SUMMARY
General Medicine Discharge Summary     Patient ID:  Kip Blake  [de-identified]year old  10/19/1935    Admit date: 10/15/2017    Discharge date and time: 10/19/17    Attending Physician: Dwayne Cunningham DO WORK    Radiology reports:    Xr Femur Min 2 Views Right (cpt=73552)    Result Date: 10/15/2017  PROCEDURE:  XR FEMUR (2 VIEWS), RIGHT (CPT=73550)  TECHNIQUE:  AP and lateral views were obtained. COMPARISON:  None.   INDICATIONS:  unwittnessed fall  PATIEN and third ventricles. No mass effect. Postoperative changes in the paranasal sinuses is noted. Because portions of the orbits are unremarkable. IMPRESSION: There has been interval mild loss of the caudal aspect of both frontal lobes.  Sequelae of chronic s right femur as described above. Dictated by: Mikael Bragg DO on 10/15/2017 at 9:14     Approved by:  Mikael Bragg DO            Xr Femur 1 View Right Sh(cpt=73551)    Result Date: 10/16/2017  PROCEDURE:  XR FEMUR 1 VIEW RIGHT SH(CPT=73551)  TECHNIQUE: nightly. Calcium Carb-Cholecalciferol (CALCIUM 1000 + D OR)  Take 1 tablet by mouth 2 (two) times daily.  Nature Made extra strength    B COMPLEX OR TABS  1 tablet daily    MULTIVITAMINS OR TABS  1 tablet qd    cholestyramine light 4 g Oral Powd Pack  Ta

## 2017-10-19 NOTE — PLAN OF CARE
HEMATOLOGIC - ADULT    • Maintains hematologic stability Progressing        PAIN - ADULT    • Verbalizes/displays adequate comfort level or patient's stated pain goal Progressing        Pt having minimal pain today, controlled with Norco prn.   HGB 6.9 this

## 2017-10-19 NOTE — PROGRESS NOTES
[facility] Patient Status:  Inpatient    10/19/1935 MRN [id number]   [facility] 3SW-A Attending Felipe Joseph,    Hosp Day # 3 PCP Kelsea Tyson MD     Nany Alvarez is a 80year old male patient. • Essential hypertension, benign 7/7/2008   • Esthesioneuroblastoma ([facility] [address] 12/28/2010   • GERD    • Hearing impairment    • Hearing loss 6/7/2010    has hearing aids   • High blood pressure    • High cholesterol    • Hip joint replacement by other means 7 partially controlled. Objective:  NV OK; no calf pain  Assessment & Plan:  Stable. To ECF tomorrow?

## 2017-10-20 VITALS
SYSTOLIC BLOOD PRESSURE: 155 MMHG | TEMPERATURE: 98 F | HEART RATE: 65 BPM | OXYGEN SATURATION: 97 % | HEIGHT: 68 IN | RESPIRATION RATE: 18 BRPM | BODY MASS INDEX: 33.34 KG/M2 | WEIGHT: 220 LBS | DIASTOLIC BLOOD PRESSURE: 72 MMHG

## 2017-10-20 PROCEDURE — 85018 HEMOGLOBIN: CPT | Performed by: HOSPITALIST

## 2017-10-20 PROCEDURE — 94664 DEMO&/EVAL PT USE INHALER: CPT

## 2017-10-20 NOTE — PROGRESS NOTES
General Medicine Discharge Summary     Patient ID:  Nisha Casey  [de-identified]year old  10/19/1935    Admit date: 10/15/2017    Discharge date and time: 10/19/17    Attending Physician: Desiree Clark DO WORK    Radiology reports:    Xr Femur Min 2 Views Right (cpt=73552)    Result Date: 10/15/2017  PROCEDURE:  XR FEMUR (2 VIEWS), RIGHT (CPT=73550)  TECHNIQUE:  AP and lateral views were obtained. COMPARISON:  None.   INDICATIONS:  unwittnessed fall  PATIEN and third ventricles. No mass effect. Postoperative changes in the paranasal sinuses is noted. Because portions of the orbits are unremarkable. IMPRESSION: There has been interval mild loss of the caudal aspect of both frontal lobes.  Sequelae of chronic s right femur as described above. Dictated by: Lazaro Fang DO on 10/15/2017 at 9:14     Approved by:  Lazaro Fang DO            Xr Femur 1 View Right Sh(cpt=73551)    Result Date: 10/16/2017  PROCEDURE:  XR FEMUR 1 VIEW RIGHT SH(CPT=73551)  TECHNIQUE: nightly. Calcium Carb-Cholecalciferol (CALCIUM 1000 + D OR)  Take 1 tablet by mouth 2 (two) times daily.  Nature Made extra strength    B COMPLEX OR TABS  1 tablet daily    MULTIVITAMINS OR TABS  1 tablet qd    cholestyramine light 4 g Oral Powd Pack  Ta

## 2017-10-20 NOTE — PLAN OF CARE
"Chief Complaint   Patient presents with     Derm Problem       Initial /74 (BP Location: Left arm, Patient Position: Sitting, Cuff Size: Adult Small)  Pulse 73  Temp 97.4  F (36.3  C) (Tympanic)  Ht 5' 9\" (1.753 m)  Wt 167 lb (75.8 kg)  BMI 24.66 kg/m2 Estimated body mass index is 24.66 kg/(m^2) as calculated from the following:    Height as of this encounter: 5' 9\" (1.753 m).    Weight as of this encounter: 167 lb (75.8 kg).  Medication Reconciliation: complete    " HEMATOLOGIC - ADULT    • Maintains hematologic stability Progressing          Impaired Activities of Daily Living    • Achieve highest/safest level of independence in self care Progressing          MUSCULOSKELETAL - ADULT    • Return mobility to safest lev

## 2017-10-20 NOTE — PLAN OF CARE
HEMATOLOGIC - ADULT    • Maintains hematologic stability Progressing        MUSCULOSKELETAL - ADULT    • Return mobility to safest level of function Progressing        PAIN - ADULT    • Verbalizes/displays adequate comfort level or patient's stated pain go

## 2017-10-20 NOTE — PLAN OF CARE
HEMATOLOGIC - ADULT    • Maintains hematologic stability Adequate for Discharge        MUSCULOSKELETAL - ADULT    • Return mobility to safest level of function Adequate for Discharge        PAIN - ADULT    • Verbalizes/displays adequate comfort level or pa

## 2017-10-20 NOTE — CM/SW NOTE
Per RN Maite Britton pt is cleared by MDs for discharge. I contacted Radha Bowens with [facility] and they would like 530pm discharge time if possible. I contacted Lilliam Castillo with THE [facility] transport and they can nam 530pm S transport.      I contacted pts wife and she

## 2017-10-20 NOTE — DISCHARGE SUMMARY
General Medicine Discharge Summary     Patient ID:  Karthik Pham  [de-identified]year old  10/19/1935    Admit date: 10/15/2017    Discharge date and time: 10/20/17    Attending Physician: Diandra Gary DO SOCIAL WORK  IP CONSULT TO SOCIAL WORK    Radiology reports:    Xr Femur Min 2 Views Right (cpt=73552)    Result Date: 10/15/2017  PROCEDURE:  XR FEMUR (2 VIEWS), RIGHT (CPT=73550)  TECHNIQUE:  AP and lateral views were obtained. COMPARISON:  None.   Savanna Campos increased prominence of the lateral and third ventricles. No mass effect. Postoperative changes in the paranasal sinuses is noted. Because portions of the orbits are unremarkable.   IMPRESSION: There has been interval mild loss of the caudal aspect of both fracture along the mid shaft of the right femur as described above. Dictated by: Aileen Ray DO on 10/15/2017 at 9:14     Approved by:  Aileen Ray DO            Xr Femur 1 View Right Sh(cpt=73551)    Result Date: 10/16/2017  PROCEDURE:  XR FEMUR 1 V Tab  Take 650 mg by mouth nightly. Calcium Carb-Cholecalciferol (CALCIUM 1000 + D OR)  Take 1 tablet by mouth 2 (two) times daily.  Nature Made extra strength    B COMPLEX OR TABS  1 tablet daily    MULTIVITAMINS OR TABS  1 tablet qd    cholestyramine li

## 2017-10-23 ENCOUNTER — SNF VISIT (OUTPATIENT)
Dept: INTERNAL MEDICINE CLINIC | Age: 82
End: 2017-10-23

## 2017-10-23 ENCOUNTER — LAB ENCOUNTER (OUTPATIENT)
Dept: LAB | Age: 82
End: 2017-10-23
Attending: INTERNAL MEDICINE

## 2017-10-23 VITALS
TEMPERATURE: 98 F | HEART RATE: 58 BPM | RESPIRATION RATE: 20 BRPM | OXYGEN SATURATION: 97 % | DIASTOLIC BLOOD PRESSURE: 72 MMHG | SYSTOLIC BLOOD PRESSURE: 143 MMHG

## 2017-10-23 DIAGNOSIS — E55.9 VITAMIN D DEFICIENCY: ICD-10-CM

## 2017-10-23 DIAGNOSIS — R13.10 SWALLOWING PROBLEM: ICD-10-CM

## 2017-10-23 DIAGNOSIS — K21.9 GASTROESOPHAGEAL REFLUX DISEASE, ESOPHAGITIS PRESENCE NOT SPECIFIED: ICD-10-CM

## 2017-10-23 DIAGNOSIS — R06.02 SHORTNESS OF BREATH: ICD-10-CM

## 2017-10-23 DIAGNOSIS — C61 PROSTATE CANCER (HCC): ICD-10-CM

## 2017-10-23 DIAGNOSIS — N40.1 BENIGN PROSTATIC HYPERPLASIA WITH LOWER URINARY TRACT SYMPTOMS, SYMPTOM DETAILS UNSPECIFIED: ICD-10-CM

## 2017-10-23 DIAGNOSIS — E11.40 CONTROLLED TYPE 2 DIABETES WITH NEUROPATHY (HCC): ICD-10-CM

## 2017-10-23 DIAGNOSIS — D47.2 MGUS (MONOCLONAL GAMMOPATHY OF UNKNOWN SIGNIFICANCE): ICD-10-CM

## 2017-10-23 DIAGNOSIS — S72.001D CLOSED FRACTURE OF RIGHT HIP REQUIRING OPERATIVE REPAIR WITH ROUTINE HEALING, SUBSEQUENT ENCOUNTER: Primary | ICD-10-CM

## 2017-10-23 DIAGNOSIS — R53.1 WEAKNESS: ICD-10-CM

## 2017-10-23 DIAGNOSIS — D62 ACUTE BLOOD LOSS AS CAUSE OF POSTOPERATIVE ANEMIA: ICD-10-CM

## 2017-10-23 DIAGNOSIS — I10 ESSENTIAL HYPERTENSION, BENIGN: ICD-10-CM

## 2017-10-23 DIAGNOSIS — R53.1 WEAKNESS: Primary | ICD-10-CM

## 2017-10-23 PROBLEM — Z47.89 ORTHOPEDIC AFTERCARE: Status: ACTIVE | Noted: 2017-10-23

## 2017-10-23 PROCEDURE — 80053 COMPREHEN METABOLIC PANEL: CPT

## 2017-10-23 PROCEDURE — 82306 VITAMIN D 25 HYDROXY: CPT

## 2017-10-23 PROCEDURE — 36415 COLL VENOUS BLD VENIPUNCTURE: CPT

## 2017-10-23 PROCEDURE — 85025 COMPLETE CBC W/AUTO DIFF WBC: CPT

## 2017-10-23 PROCEDURE — 83735 ASSAY OF MAGNESIUM: CPT

## 2017-10-23 PROCEDURE — 99310 SBSQ NF CARE HIGH MDM 45: CPT | Performed by: NURSE PRACTITIONER

## 2017-10-23 NOTE — CM/SW NOTE
10/23/17 0800   Discharge disposition   Discharged to: Skilled Nurs   Name of [address]   Discharge transportation [facility] 10/20/17

## 2017-10-23 NOTE — PROGRESS NOTES
Harish Jeanna Licea  : 10/19/1935  Age [de-identified]year old  male patient is admitted to Facility: [place] [address] for ALYSHA s/p right hip ORIF.     [address] [place] [address] Drive date:    10.15.17  Discharge date to [facility]:  10.20.17  ELOS:    19-21 days  Anticip cholesterol    • Hip joint replacement by other means 7/7/2008    both    • Hyperglycemia 6/8/2012   • Myopathy     saw Dr. Era Kaur 12/29/10    • Nonspecific elevation of levels of transaminase or lactic acid dehydrogenase (LDH) 12/11/2008   • Osteoarthrosis Benny  6/15/10: NASAL SCOPY,REMV TOTL ETHMOID      Comment: Performed by Uyen Cunningham at [address]  2007: OTHER SURGICAL HISTORY      Comment: External Beam Radiation of the prostate  8/13/2010: OTHER SURGICAL HIS BICALUTAMIDE 50 MG Oral Tab TAKE 1 TABLET BY MOUTH DAILY.  Disp: 90 tablet Rfl: 2   GABAPENTIN 600 MG Oral Tab TAKE 2 TABLETS BY MOUTH IN THE MORNING THEN TAKE ONE TABLET IN THE AFTERNOON THEN TAKE 2 TABLETS AT DINNER TIME THEN TAKE ONE TABLET AT BEDT Dis skin lesions or rashes  WOUNDS:   Right leg incision   EYES:no visual complaints or deficits  HENT: denies nasal congestion, sinus pain or sore throat; +swallowing difficulty; +hearing impaired  RESPIRATORY: denies shortness of breath, wheezing or cough rebound tenderness.   :Deferred  LYMPHATIC:no lymphedema  MUSCULOSKELETAL: no acute synovitis upper or lower extremity  EXTREMITIES/VASCULAR:no cyanosis, clubbing or edema, radial pulses 2+ and dorsalis pedal pulses 2+; large calluses on first metatarsals prophylaxis  10. Calcium + vitamin D 500mg/400u; 2 tabs BID  11. Bowel regime:  Cholestyramine 4 gm daily; Colace 100 mg BID; Dulcolax 10 mg daily prn  12. F/U w/ Dr Jaclyn Rendon in 3 wks    Acute blood loss anemia  1. CBC weekly    Swallowing problem  1.  De Prophet

## 2017-10-25 ENCOUNTER — SNF VISIT (OUTPATIENT)
Dept: INTERNAL MEDICINE CLINIC | Age: 82
End: 2017-10-25

## 2017-10-25 VITALS
RESPIRATION RATE: 18 BRPM | SYSTOLIC BLOOD PRESSURE: 139 MMHG | HEART RATE: 75 BPM | OXYGEN SATURATION: 97 % | DIASTOLIC BLOOD PRESSURE: 68 MMHG | TEMPERATURE: 97 F

## 2017-10-25 DIAGNOSIS — S72.001D CLOSED FRACTURE OF RIGHT HIP REQUIRING OPERATIVE REPAIR WITH ROUTINE HEALING, SUBSEQUENT ENCOUNTER: Primary | ICD-10-CM

## 2017-10-25 DIAGNOSIS — E11.40 CONTROLLED TYPE 2 DIABETES WITH NEUROPATHY (HCC): ICD-10-CM

## 2017-10-25 DIAGNOSIS — R53.1 WEAKNESS: ICD-10-CM

## 2017-10-25 PROCEDURE — 99308 SBSQ NF CARE LOW MDM 20: CPT | Performed by: NURSE PRACTITIONER

## 2017-10-25 NOTE — PROGRESS NOTES
Siva Hoyt, 10/19/1935, [de-identified]year old, male    Chief Complaint:  Patient presents with:   Follow - Up: s/p right hip ORIF       Subjective:  PMH significant for HTN, HL, GERD, T2 DM w/ PN, OA, BPH/LUTS, metastatic prostate CA s/p prostatectomy and guarding, no rebound tenderness.   :Deferred  LYMPHATIC:no lymphedema  MUSCULOSKELETAL: no acute synovitis upper or lower extremity  EXTREMITIES/VASCULAR:no cyanosis, clubbing or edema, radial pulses 2+ and dorsalis pedal pulses 2+; large calluses on firs patch 25 mcg/ hr q 72 hrs  3. [facility] hospice on hold during ALYSHA  4. F/U w/ Dr Alyssa Mederos prn     Weakness  1. PT/OT  2. ELOS 19-21 days  3.  Plan DC on or before 11.9.17; SW to assist w/ DC plan     Corinne Ee, APN  10/25/2017  10:30 AM

## 2017-10-30 ENCOUNTER — LAB ENCOUNTER (OUTPATIENT)
Dept: LAB | Age: 82
End: 2017-10-30
Attending: INTERNAL MEDICINE

## 2017-10-30 DIAGNOSIS — E11.9 DM (DIABETES MELLITUS) (HCC): Primary | ICD-10-CM

## 2017-10-30 PROCEDURE — 36415 COLL VENOUS BLD VENIPUNCTURE: CPT

## 2017-10-30 PROCEDURE — 83036 HEMOGLOBIN GLYCOSYLATED A1C: CPT

## 2017-10-30 PROCEDURE — 80053 COMPREHEN METABOLIC PANEL: CPT

## 2017-10-30 PROCEDURE — 85025 COMPLETE CBC W/AUTO DIFF WBC: CPT

## 2017-11-03 ENCOUNTER — SNF VISIT (OUTPATIENT)
Dept: INTERNAL MEDICINE CLINIC | Age: 82
End: 2017-11-03

## 2017-11-03 VITALS — SYSTOLIC BLOOD PRESSURE: 130 MMHG | DIASTOLIC BLOOD PRESSURE: 73 MMHG | RESPIRATION RATE: 18 BRPM | HEART RATE: 55 BPM

## 2017-11-03 DIAGNOSIS — S72.001D CLOSED FRACTURE OF RIGHT HIP REQUIRING OPERATIVE REPAIR WITH ROUTINE HEALING, SUBSEQUENT ENCOUNTER: Primary | ICD-10-CM

## 2017-11-03 DIAGNOSIS — K64.9 HEMORRHOIDS, UNSPECIFIED HEMORRHOID TYPE: ICD-10-CM

## 2017-11-03 DIAGNOSIS — K59.03 DRUG-INDUCED CONSTIPATION: ICD-10-CM

## 2017-11-03 DIAGNOSIS — R53.1 WEAKNESS: ICD-10-CM

## 2017-11-03 DIAGNOSIS — E11.40 CONTROLLED TYPE 2 DIABETES WITH NEUROPATHY (HCC): ICD-10-CM

## 2017-11-03 PROCEDURE — 99309 SBSQ NF CARE MODERATE MDM 30: CPT | Performed by: NURSE PRACTITIONER

## 2017-11-03 RX ORDER — SENNA AND DOCUSATE SODIUM 50; 8.6 MG/1; MG/1
2 TABLET, FILM COATED ORAL AS NEEDED
Status: ON HOLD | COMMUNITY
End: 2019-01-01

## 2017-11-03 NOTE — PROGRESS NOTES
Carmine Florez, 10/19/1935, [de-identified]year old, male    Chief Complaint:  Patient presents with:   Follow - Up: s/p right hip ORIF  Constipation  Rectal Problem       Subjective:  PMH significant for HTN, HL, GERD, T2 DM w/ PN, OA, BPH/LUTS, metastatic pros +conversational dyspnea  CARDIOVASCULAR: S1, S2 normal, RRR; no S3, no S4; , no click, no murmur  ABDOMEN:  normal active BS+, soft, nondistended; no organomegaly, no masses; no bruits; nontender, no guarding, no rebound tenderness.   :Deferred  LYMPHATIC tabs daily one hr prior to therapy  9.  mg; 2 tabs q HS for DVT prophylaxis  10. Calcium + vitamin D 500mg/400u; 2 tabs BID  11. F/U w/ Dr Mauricio Vyas in 3 wks/scheduled for 11.6.17    Hemorrhoids  1. Anusol HC 2.5% crm TID prn    Constipation  1.  C

## 2017-11-06 ENCOUNTER — LAB ENCOUNTER (OUTPATIENT)
Dept: LAB | Age: 82
End: 2017-11-06
Attending: INTERNAL MEDICINE

## 2017-11-06 DIAGNOSIS — I10 HTN (HYPERTENSION): Primary | ICD-10-CM

## 2017-11-06 PROBLEM — M97.8XXD PERIPROSTHETIC FRACTURE OF HIP, SUBSEQUENT ENCOUNTER: Status: ACTIVE | Noted: 2017-11-06

## 2017-11-06 PROBLEM — Z96.649 PERIPROSTHETIC FRACTURE OF HIP, SUBSEQUENT ENCOUNTER: Status: ACTIVE | Noted: 2017-11-06

## 2017-11-06 PROBLEM — Z09 POSTOPERATIVE EXAMINATION: Status: ACTIVE | Noted: 2017-11-06

## 2017-11-06 PROCEDURE — 36415 COLL VENOUS BLD VENIPUNCTURE: CPT

## 2017-11-06 PROCEDURE — 85025 COMPLETE CBC W/AUTO DIFF WBC: CPT

## 2017-11-06 PROCEDURE — 80053 COMPREHEN METABOLIC PANEL: CPT

## 2017-11-08 ENCOUNTER — SNF DISCHARGE (OUTPATIENT)
Dept: INTERNAL MEDICINE CLINIC | Age: 82
End: 2017-11-08

## 2017-11-08 VITALS
WEIGHT: 192.81 LBS | DIASTOLIC BLOOD PRESSURE: 75 MMHG | SYSTOLIC BLOOD PRESSURE: 114 MMHG | HEART RATE: 70 BPM | OXYGEN SATURATION: 97 % | TEMPERATURE: 98 F | BODY MASS INDEX: 29 KG/M2 | RESPIRATION RATE: 18 BRPM

## 2017-11-08 DIAGNOSIS — K64.9 HEMORRHOIDS, UNSPECIFIED HEMORRHOID TYPE: ICD-10-CM

## 2017-11-08 DIAGNOSIS — C61 PROSTATE CANCER (HCC): ICD-10-CM

## 2017-11-08 DIAGNOSIS — K21.9 GASTROESOPHAGEAL REFLUX DISEASE, ESOPHAGITIS PRESENCE NOT SPECIFIED: ICD-10-CM

## 2017-11-08 DIAGNOSIS — D62 ACUTE BLOOD LOSS AS CAUSE OF POSTOPERATIVE ANEMIA: ICD-10-CM

## 2017-11-08 DIAGNOSIS — K59.03 DRUG-INDUCED CONSTIPATION: ICD-10-CM

## 2017-11-08 DIAGNOSIS — R06.02 SHORTNESS OF BREATH: ICD-10-CM

## 2017-11-08 DIAGNOSIS — I10 ESSENTIAL HYPERTENSION, BENIGN: ICD-10-CM

## 2017-11-08 DIAGNOSIS — R13.10 SWALLOWING PROBLEM: ICD-10-CM

## 2017-11-08 DIAGNOSIS — S72.001D CLOSED FRACTURE OF RIGHT HIP REQUIRING OPERATIVE REPAIR WITH ROUTINE HEALING, SUBSEQUENT ENCOUNTER: Primary | ICD-10-CM

## 2017-11-08 DIAGNOSIS — N40.1 BENIGN PROSTATIC HYPERPLASIA WITH LOWER URINARY TRACT SYMPTOMS, SYMPTOM DETAILS UNSPECIFIED: ICD-10-CM

## 2017-11-08 DIAGNOSIS — E11.40 CONTROLLED TYPE 2 DIABETES WITH NEUROPATHY (HCC): ICD-10-CM

## 2017-11-08 DIAGNOSIS — R53.1 WEAKNESS: ICD-10-CM

## 2017-11-08 DIAGNOSIS — D47.2 MGUS (MONOCLONAL GAMMOPATHY OF UNKNOWN SIGNIFICANCE): ICD-10-CM

## 2017-11-08 PROCEDURE — 99316 NF DSCHRG MGMT 30 MIN+: CPT | Performed by: NURSE PRACTITIONER

## 2017-11-08 NOTE — PROGRESS NOTES
Minal Licea, 10/19/1935, [de-identified]year old, male is being discharged from Facility: [address]    Date of Admission:  10.20.17    Date of Discharge:  Anticipated on 11.11.17                                 Admitting HEALTH: well developed, well nourished, in no apparent distress; sitting in wheelchair at bedside  LINES, TUBES, DRAINS:  none  SKIN: no rashes, no suspicious lesions, pale, warm, dry  WOUND:   RLE surgical incision:  Staples have been removed, edges well 11/06/2017   .0 11/06/2017       Lab Results  Component Value Date   GLU 82 11/06/2017   BUN 15 11/06/2017   CREATSERUM 0.75 11/06/2017   ANIONGAP 11.8 (H) 02/06/2014   GFR 87 11/06/2017   GFRNAA > 90 04/07/2013   GFRAA > 90 04/07/2013   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 Bicalutaminde 50 mg daily  2. Fentanyl patch 25 mcg/ hr q 72 hrs  3. Resume [facility] hospice  4. F/U w/ Dr Regino Eden prn     Weakness  1.  Home health CNA/RN/PT/OT/ST [place] and tx    Medication Reconciliation Completed:  Yes    Follow Up Visits:    PCP <

## 2018-02-15 PROBLEM — Z47.89 ORTHOPEDIC AFTERCARE: Status: RESOLVED | Noted: 2017-10-23 | Resolved: 2018-02-15

## 2018-02-15 PROBLEM — Z09 POSTOPERATIVE EXAMINATION: Status: RESOLVED | Noted: 2017-11-06 | Resolved: 2018-02-15

## 2018-02-24 ENCOUNTER — APPOINTMENT (OUTPATIENT)
Dept: GENERAL RADIOLOGY | Facility: HOSPITAL | Age: 83
End: 2018-02-24
Attending: EMERGENCY MEDICINE
Payer: MEDICARE

## 2018-02-24 ENCOUNTER — APPOINTMENT (OUTPATIENT)
Dept: CT IMAGING | Facility: HOSPITAL | Age: 83
End: 2018-02-24
Attending: EMERGENCY MEDICINE
Payer: MEDICARE

## 2018-02-24 ENCOUNTER — HOSPITAL ENCOUNTER (EMERGENCY)
Facility: HOSPITAL | Age: 83
Discharge: HOME | End: 2018-02-24
Attending: EMERGENCY MEDICINE
Payer: MEDICARE

## 2018-02-24 VITALS
HEART RATE: 48 BPM | HEIGHT: 68 IN | DIASTOLIC BLOOD PRESSURE: 70 MMHG | WEIGHT: 182 LBS | OXYGEN SATURATION: 96 % | SYSTOLIC BLOOD PRESSURE: 147 MMHG | RESPIRATION RATE: 18 BRPM | BODY MASS INDEX: 27.58 KG/M2 | TEMPERATURE: 98 F

## 2018-02-24 DIAGNOSIS — R51.9 ACUTE NONINTRACTABLE HEADACHE, UNSPECIFIED HEADACHE TYPE: Primary | ICD-10-CM

## 2018-02-24 DIAGNOSIS — M79.602 ARM PAIN, DIFFUSE, LEFT: ICD-10-CM

## 2018-02-24 DIAGNOSIS — C61 PROSTATE CANCER METASTATIC TO BONE (HCC): ICD-10-CM

## 2018-02-24 DIAGNOSIS — C79.51 PROSTATE CANCER METASTATIC TO BONE (HCC): ICD-10-CM

## 2018-02-24 LAB
ALBUMIN SERPL-MCNC: 3.4 G/DL (ref 3.5–4.8)
ALP LIVER SERPL-CCNC: 133 U/L (ref 45–117)
ALT SERPL-CCNC: 13 U/L (ref 17–63)
AST SERPL-CCNC: 14 U/L (ref 15–41)
BASOPHILS # BLD AUTO: 0.03 X10(3) UL (ref 0–0.1)
BASOPHILS NFR BLD AUTO: 0.4 %
BILIRUB SERPL-MCNC: 0.2 MG/DL (ref 0.1–2)
BILIRUB UR QL STRIP.AUTO: NEGATIVE
BUN BLD-MCNC: 10 MG/DL (ref 8–20)
CALCIUM BLD-MCNC: 9.2 MG/DL (ref 8.3–10.3)
CHLORIDE: 100 MMOL/L (ref 101–111)
CLARITY UR REFRACT.AUTO: CLEAR
CO2: 33 MMOL/L (ref 22–32)
COLOR UR AUTO: YELLOW
CREAT BLD-MCNC: 0.68 MG/DL (ref 0.7–1.3)
EOSINOPHIL # BLD AUTO: 0.18 X10(3) UL (ref 0–0.3)
EOSINOPHIL NFR BLD AUTO: 2.5 %
ERYTHROCYTE [DISTWIDTH] IN BLOOD BY AUTOMATED COUNT: 13.6 % (ref 11.5–16)
GLUCOSE BLD-MCNC: 136 MG/DL (ref 70–99)
GLUCOSE UR STRIP.AUTO-MCNC: NEGATIVE MG/DL
HCT VFR BLD AUTO: 36.2 % (ref 37–53)
HGB BLD-MCNC: 11.9 G/DL (ref 13–17)
IMMATURE GRANULOCYTE COUNT: 0.03 X10(3) UL (ref 0–1)
IMMATURE GRANULOCYTE RATIO %: 0.4 %
KETONES UR STRIP.AUTO-MCNC: NEGATIVE MG/DL
LDH: 147 U/L (ref 84–249)
LEUKOCYTE ESTERASE UR QL STRIP.AUTO: NEGATIVE
LYMPHOCYTES # BLD AUTO: 2.02 X10(3) UL (ref 0.9–4)
LYMPHOCYTES NFR BLD AUTO: 28.1 %
M PROTEIN MFR SERPL ELPH: 7.9 G/DL (ref 6.1–8.3)
MCH RBC QN AUTO: 29.5 PG (ref 27–33.2)
MCHC RBC AUTO-ENTMCNC: 32.9 G/DL (ref 31–37)
MCV RBC AUTO: 89.6 FL (ref 80–99)
MONOCYTES # BLD AUTO: 0.64 X10(3) UL (ref 0.1–1)
MONOCYTES NFR BLD AUTO: 8.9 %
NEUTROPHIL ABS PRELIM: 4.29 X10 (3) UL (ref 1.3–6.7)
NEUTROPHILS # BLD AUTO: 4.29 X10(3) UL (ref 1.3–6.7)
NEUTROPHILS NFR BLD AUTO: 59.7 %
NITRITE UR QL STRIP.AUTO: NEGATIVE
PH UR STRIP.AUTO: 6 [PH] (ref 4.5–8)
PLATELET # BLD AUTO: 145 10(3)UL (ref 150–450)
POTASSIUM SERPL-SCNC: 3.8 MMOL/L (ref 3.6–5.1)
PROT UR STRIP.AUTO-MCNC: NEGATIVE MG/DL
RBC # BLD AUTO: 4.04 X10(6)UL (ref 3.8–5.8)
RBC UR QL AUTO: NEGATIVE
RED CELL DISTRIBUTION WIDTH-SD: 44.3 FL (ref 35.1–46.3)
SODIUM SERPL-SCNC: 138 MMOL/L (ref 136–144)
SP GR UR STRIP.AUTO: 1.01 (ref 1–1.03)
UROBILINOGEN UR STRIP.AUTO-MCNC: <2 MG/DL
WBC # BLD AUTO: 7.2 X10(3) UL (ref 4–13)

## 2018-02-24 PROCEDURE — 70450 CT HEAD/BRAIN W/O DYE: CPT | Performed by: EMERGENCY MEDICINE

## 2018-02-24 PROCEDURE — 83615 LACTATE (LD) (LDH) ENZYME: CPT | Performed by: EMERGENCY MEDICINE

## 2018-02-24 PROCEDURE — 81003 URINALYSIS AUTO W/O SCOPE: CPT | Performed by: EMERGENCY MEDICINE

## 2018-02-24 PROCEDURE — 96361 HYDRATE IV INFUSION ADD-ON: CPT

## 2018-02-24 PROCEDURE — 96360 HYDRATION IV INFUSION INIT: CPT

## 2018-02-24 PROCEDURE — 99284 EMERGENCY DEPT VISIT MOD MDM: CPT

## 2018-02-24 PROCEDURE — 85025 COMPLETE CBC W/AUTO DIFF WBC: CPT | Performed by: EMERGENCY MEDICINE

## 2018-02-24 PROCEDURE — 71045 X-RAY EXAM CHEST 1 VIEW: CPT | Performed by: EMERGENCY MEDICINE

## 2018-02-24 PROCEDURE — 80053 COMPREHEN METABOLIC PANEL: CPT | Performed by: EMERGENCY MEDICINE

## 2018-02-24 PROCEDURE — 73060 X-RAY EXAM OF HUMERUS: CPT | Performed by: EMERGENCY MEDICINE

## 2018-02-24 PROCEDURE — 73090 X-RAY EXAM OF FOREARM: CPT | Performed by: EMERGENCY MEDICINE

## 2018-02-24 RX ORDER — SODIUM CHLORIDE 9 MG/ML
125 INJECTION, SOLUTION INTRAVENOUS CONTINUOUS
Status: DISCONTINUED | OUTPATIENT
Start: 2018-02-24 | End: 2018-02-25

## 2018-02-25 NOTE — ED PROVIDER NOTES
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 Patient does seem slightly slow to answer but is able to answer questions appropriately   Skin: Skin is warm and dry. Psychiatric: He has a normal mood and affect. Nursing note and vitals reviewed.            ED Course because in the interim apparently the hospice nurse did try to go to the house but the patient had already called 911 and was in route to the hospital.  The hospice nurse was updated I reviewed all the findings with the patient's family.   Patient was offer

## 2018-02-25 NOTE — ED INITIAL ASSESSMENT (HPI)
Pt c/o headache, left arm numbness and pain. Pt denies chest pain.      Took a 325mg ASA @ 1pm - also took morphine and norco around 6:30pm     Pt is hospice pt for prostate CA

## 2018-03-20 ENCOUNTER — HOSPITAL ENCOUNTER (EMERGENCY)
Facility: HOSPITAL | Age: 83
Discharge: STILL ADMITTED OR ADMINISTRATIVE | End: 2018-03-26

## 2018-07-16 PROBLEM — D69.6 THROMBOCYTOPENIA (HCC): Status: ACTIVE | Noted: 2018-07-16

## 2018-07-16 PROBLEM — I70.0 AORTIC ATHEROSCLEROSIS (HCC): Status: ACTIVE | Noted: 2018-07-16

## 2018-07-16 PROBLEM — S72.001A CLOSED FRACTURE OF RIGHT HIP REQUIRING OPERATIVE REPAIR (HCC): Status: RESOLVED | Noted: 2017-10-15 | Resolved: 2018-07-16

## 2018-07-16 PROBLEM — S72.001A: Status: RESOLVED | Noted: 2017-10-15 | Resolved: 2018-07-16

## 2019-01-01 ENCOUNTER — ANESTHESIA (OUTPATIENT)
Dept: SURGERY | Facility: HOSPITAL | Age: 84
DRG: 464 | End: 2019-01-01
Payer: MEDICARE

## 2019-01-01 ENCOUNTER — APPOINTMENT (OUTPATIENT)
Dept: CT IMAGING | Facility: HOSPITAL | Age: 84
End: 2019-01-01
Attending: EMERGENCY MEDICINE
Payer: MEDICARE

## 2019-01-01 ENCOUNTER — HOSPITAL ENCOUNTER (OUTPATIENT)
Facility: HOSPITAL | Age: 84
Setting detail: HOSPITAL OUTPATIENT SURGERY
Discharge: HOME | End: 2019-01-01
Attending: ORTHOPAEDIC SURGERY | Admitting: ORTHOPAEDIC SURGERY
Payer: MEDICARE

## 2019-01-01 ENCOUNTER — HOSPITAL ENCOUNTER (INPATIENT)
Facility: HOSPITAL | Age: 84
LOS: 4 days | Discharge: HOSPICE | DRG: 464 | End: 2019-01-01
Attending: ORTHOPAEDIC SURGERY | Admitting: ORTHOPAEDIC SURGERY
Payer: MEDICARE

## 2019-01-01 ENCOUNTER — ANESTHESIA EVENT (OUTPATIENT)
Dept: SURGERY | Facility: HOSPITAL | Age: 84
DRG: 464 | End: 2019-01-01
Payer: MEDICARE

## 2019-01-01 ENCOUNTER — HOSPITAL ENCOUNTER (EMERGENCY)
Facility: HOSPITAL | Age: 84
Discharge: HOME | End: 2019-01-01
Attending: EMERGENCY MEDICINE
Payer: MEDICARE

## 2019-01-01 VITALS
TEMPERATURE: 98 F | SYSTOLIC BLOOD PRESSURE: 144 MMHG | BODY MASS INDEX: 23.42 KG/M2 | WEIGHT: 154.5 LBS | HEART RATE: 67 BPM | OXYGEN SATURATION: 97 % | HEIGHT: 68 IN | DIASTOLIC BLOOD PRESSURE: 68 MMHG | RESPIRATION RATE: 16 BRPM

## 2019-01-01 VITALS
RESPIRATION RATE: 16 BRPM | BODY MASS INDEX: 25.76 KG/M2 | DIASTOLIC BLOOD PRESSURE: 81 MMHG | TEMPERATURE: 98 F | HEIGHT: 68 IN | SYSTOLIC BLOOD PRESSURE: 186 MMHG | OXYGEN SATURATION: 94 % | WEIGHT: 170 LBS | HEART RATE: 60 BPM

## 2019-01-01 VITALS
OXYGEN SATURATION: 98 % | WEIGHT: 170 LBS | HEIGHT: 70 IN | DIASTOLIC BLOOD PRESSURE: 88 MMHG | TEMPERATURE: 99 F | RESPIRATION RATE: 21 BRPM | SYSTOLIC BLOOD PRESSURE: 171 MMHG | HEART RATE: 79 BPM | BODY MASS INDEX: 24.34 KG/M2

## 2019-01-01 VITALS
SYSTOLIC BLOOD PRESSURE: 181 MMHG | BODY MASS INDEX: 22.4 KG/M2 | HEART RATE: 56 BPM | WEIGHT: 160 LBS | TEMPERATURE: 97 F | RESPIRATION RATE: 16 BRPM | HEIGHT: 71 IN | OXYGEN SATURATION: 96 % | DIASTOLIC BLOOD PRESSURE: 79 MMHG

## 2019-01-01 DIAGNOSIS — E16.2 HYPOGLYCEMIA: Primary | ICD-10-CM

## 2019-01-01 DIAGNOSIS — N39.0 URINARY TRACT INFECTION WITH HEMATURIA, SITE UNSPECIFIED: ICD-10-CM

## 2019-01-01 DIAGNOSIS — S09.90XA INJURY OF HEAD, INITIAL ENCOUNTER: Primary | ICD-10-CM

## 2019-01-01 DIAGNOSIS — R31.9 URINARY TRACT INFECTION WITH HEMATURIA, SITE UNSPECIFIED: ICD-10-CM

## 2019-01-01 DIAGNOSIS — Z96.649 PERIPROSTHETIC FRACTURE OF HIP, SUBSEQUENT ENCOUNTER: ICD-10-CM

## 2019-01-01 DIAGNOSIS — Z96.9 RETAINED ORTHOPEDIC HARDWARE: ICD-10-CM

## 2019-01-01 DIAGNOSIS — M97.8XXD PERIPROSTHETIC FRACTURE OF HIP, SUBSEQUENT ENCOUNTER: ICD-10-CM

## 2019-01-01 LAB
ALBUMIN SERPL-MCNC: 3.2 G/DL (ref 3.4–5)
ALBUMIN SERPL-MCNC: 3.3 G/DL (ref 3.4–5)
ALBUMIN/GLOB SERPL: 0.8 {RATIO} (ref 1–2)
ALBUMIN/GLOB SERPL: 0.8 {RATIO} (ref 1–2)
ALP LIVER SERPL-CCNC: 130 U/L (ref 45–117)
ALP LIVER SERPL-CCNC: 134 U/L (ref 45–117)
ALT SERPL-CCNC: 20 U/L (ref 16–61)
ALT SERPL-CCNC: 21 U/L (ref 16–61)
ANION GAP SERPL CALC-SCNC: 5 MMOL/L (ref 0–18)
ANION GAP SERPL CALC-SCNC: 6 MMOL/L (ref 0–18)
ANION GAP SERPL CALC-SCNC: 7 MMOL/L (ref 0–18)
ANTIBODY SCREEN: NEGATIVE
AST SERPL-CCNC: 22 U/L (ref 15–37)
AST SERPL-CCNC: 27 U/L (ref 15–37)
ATRIAL RATE: 79 BPM
BASOPHILS # BLD AUTO: 0.02 X10(3) UL (ref 0–0.2)
BASOPHILS # BLD AUTO: 0.03 X10(3) UL (ref 0–0.2)
BASOPHILS # BLD AUTO: 0.05 X10(3) UL (ref 0–0.2)
BASOPHILS NFR BLD AUTO: 0.3 %
BASOPHILS NFR BLD AUTO: 0.4 %
BASOPHILS NFR BLD AUTO: 0.5 %
BASOPHILS NFR BLD AUTO: 0.5 %
BASOPHILS NFR BLD AUTO: 0.6 %
BILIRUB SERPL-MCNC: 0.4 MG/DL (ref 0.1–2)
BILIRUB SERPL-MCNC: 0.4 MG/DL (ref 0.1–2)
BILIRUB UR QL STRIP.AUTO: NEGATIVE
BUN BLD-MCNC: 11 MG/DL (ref 7–18)
BUN BLD-MCNC: 11 MG/DL (ref 7–18)
BUN BLD-MCNC: 12 MG/DL (ref 7–18)
BUN/CREAT SERPL: 20.8 (ref 10–20)
BUN/CREAT SERPL: 21.2 (ref 10–20)
BUN/CREAT SERPL: 22.6 (ref 10–20)
CALCIUM BLD-MCNC: 9 MG/DL (ref 8.5–10.1)
CALCIUM BLD-MCNC: 9 MG/DL (ref 8.5–10.1)
CALCIUM BLD-MCNC: 9.3 MG/DL (ref 8.5–10.1)
CHLORIDE SERPL-SCNC: 104 MMOL/L (ref 98–107)
CHLORIDE SERPL-SCNC: 104 MMOL/L (ref 98–107)
CHLORIDE SERPL-SCNC: 104 MMOL/L (ref 98–112)
CLARITY UR REFRACT.AUTO: CLEAR
CO2 SERPL-SCNC: 29 MMOL/L (ref 21–32)
CO2 SERPL-SCNC: 29 MMOL/L (ref 21–32)
CO2 SERPL-SCNC: 32 MMOL/L (ref 21–32)
CREAT BLD-MCNC: 0.52 MG/DL (ref 0.7–1.3)
CREAT BLD-MCNC: 0.53 MG/DL (ref 0.7–1.3)
CREAT BLD-MCNC: 0.53 MG/DL (ref 0.7–1.3)
DEPRECATED RDW RBC AUTO: 43.8 FL (ref 35.1–46.3)
DEPRECATED RDW RBC AUTO: 44.3 FL (ref 35.1–46.3)
DEPRECATED RDW RBC AUTO: 44.8 FL (ref 35.1–46.3)
DEPRECATED RDW RBC AUTO: 46.4 FL (ref 35.1–46.3)
DEPRECATED RDW RBC AUTO: 48.9 FL (ref 35.1–46.3)
EOSINOPHIL # BLD AUTO: 0.11 X10(3) UL (ref 0–0.7)
EOSINOPHIL # BLD AUTO: 0.13 X10(3) UL (ref 0–0.7)
EOSINOPHIL # BLD AUTO: 0.14 X10(3) UL (ref 0–0.7)
EOSINOPHIL # BLD AUTO: 0.27 X10(3) UL (ref 0–0.7)
EOSINOPHIL # BLD AUTO: 0.29 X10(3) UL (ref 0–0.7)
EOSINOPHIL NFR BLD AUTO: 1.8 %
EOSINOPHIL NFR BLD AUTO: 1.9 %
EOSINOPHIL NFR BLD AUTO: 2.3 %
EOSINOPHIL NFR BLD AUTO: 3.3 %
EOSINOPHIL NFR BLD AUTO: 4.4 %
ERYTHROCYTE [DISTWIDTH] IN BLOOD BY AUTOMATED COUNT: 13.3 % (ref 11–15)
ERYTHROCYTE [DISTWIDTH] IN BLOOD BY AUTOMATED COUNT: 13.4 % (ref 11–15)
ERYTHROCYTE [DISTWIDTH] IN BLOOD BY AUTOMATED COUNT: 13.4 % (ref 11–15)
ERYTHROCYTE [DISTWIDTH] IN BLOOD BY AUTOMATED COUNT: 13.7 % (ref 11–15)
ERYTHROCYTE [DISTWIDTH] IN BLOOD BY AUTOMATED COUNT: 13.9 % (ref 11–15)
EST. AVERAGE GLUCOSE BLD GHB EST-MCNC: 105 MG/DL (ref 68–126)
GLOBULIN PLAS-MCNC: 4.1 G/DL (ref 2.8–4.4)
GLOBULIN PLAS-MCNC: 4.2 G/DL (ref 2.8–4.4)
GLUCOSE BLD-MCNC: 107 MG/DL (ref 70–99)
GLUCOSE BLD-MCNC: 129 MG/DL (ref 70–99)
GLUCOSE BLD-MCNC: 136 MG/DL (ref 70–99)
GLUCOSE BLD-MCNC: 55 MG/DL (ref 70–99)
GLUCOSE BLD-MCNC: 81 MG/DL (ref 70–99)
GLUCOSE BLD-MCNC: 81 MG/DL (ref 70–99)
GLUCOSE BLD-MCNC: 88 MG/DL (ref 70–99)
GLUCOSE BLD-MCNC: 91 MG/DL (ref 70–99)
GLUCOSE BLDC GLUCOMTR-MCNC: 100 MG/DL (ref 70–99)
GLUCOSE BLDC GLUCOMTR-MCNC: 100 MG/DL (ref 70–99)
GLUCOSE BLDC GLUCOMTR-MCNC: 104 MG/DL (ref 70–99)
GLUCOSE BLDC GLUCOMTR-MCNC: 108 MG/DL (ref 70–99)
GLUCOSE BLDC GLUCOMTR-MCNC: 113 MG/DL (ref 70–99)
GLUCOSE BLDC GLUCOMTR-MCNC: 114 MG/DL (ref 70–99)
GLUCOSE BLDC GLUCOMTR-MCNC: 126 MG/DL (ref 70–99)
GLUCOSE BLDC GLUCOMTR-MCNC: 134 MG/DL (ref 70–99)
GLUCOSE BLDC GLUCOMTR-MCNC: 139 MG/DL (ref 70–99)
GLUCOSE BLDC GLUCOMTR-MCNC: 184 MG/DL (ref 70–99)
GLUCOSE BLDC GLUCOMTR-MCNC: 70 MG/DL (ref 70–99)
GLUCOSE BLDC GLUCOMTR-MCNC: 71 MG/DL (ref 70–99)
GLUCOSE BLDC GLUCOMTR-MCNC: 74 MG/DL (ref 70–99)
GLUCOSE BLDC GLUCOMTR-MCNC: 87 MG/DL (ref 70–99)
GLUCOSE BLDC GLUCOMTR-MCNC: 92 MG/DL (ref 70–99)
GLUCOSE BLDC GLUCOMTR-MCNC: 92 MG/DL (ref 70–99)
GLUCOSE BLDC GLUCOMTR-MCNC: 97 MG/DL (ref 70–99)
GLUCOSE BLDC GLUCOMTR-MCNC: 97 MG/DL (ref 70–99)
GLUCOSE BLDC GLUCOMTR-MCNC: 98 MG/DL (ref 70–99)
GLUCOSE UR STRIP.AUTO-MCNC: NEGATIVE MG/DL
HAV IGM SER QL: 2.3 MG/DL (ref 1.6–2.6)
HBA1C MFR BLD HPLC: 5.3 % (ref ?–5.7)
HCT VFR BLD AUTO: 30.9 % (ref 39–53)
HCT VFR BLD AUTO: 32.2 % (ref 39–53)
HCT VFR BLD AUTO: 34.9 % (ref 39–53)
HCT VFR BLD AUTO: 36.4 % (ref 39–53)
HCT VFR BLD AUTO: 36.5 % (ref 39–53)
HGB BLD-MCNC: 10.2 G/DL (ref 13–17.5)
HGB BLD-MCNC: 11.8 G/DL (ref 13–17.5)
HGB BLD-MCNC: 12.1 G/DL (ref 13–17.5)
HGB BLD-MCNC: 12.3 G/DL (ref 13–17.5)
HGB BLD-MCNC: 9.8 G/DL (ref 13–17.5)
IMM GRANULOCYTES # BLD AUTO: 0.02 X10(3) UL (ref 0–1)
IMM GRANULOCYTES # BLD AUTO: 0.02 X10(3) UL (ref 0–1)
IMM GRANULOCYTES # BLD AUTO: 0.03 X10(3) UL (ref 0–1)
IMM GRANULOCYTES NFR BLD: 0.3 %
IMM GRANULOCYTES NFR BLD: 0.4 %
IMM GRANULOCYTES NFR BLD: 0.5 %
INR BLD: 1.19 (ref 0.9–1.2)
KETONES UR STRIP.AUTO-MCNC: NEGATIVE MG/DL
LYMPHOCYTES # BLD AUTO: 0.77 X10(3) UL (ref 1–4)
LYMPHOCYTES # BLD AUTO: 1.06 X10(3) UL (ref 1–4)
LYMPHOCYTES # BLD AUTO: 1.88 X10(3) UL (ref 1–4)
LYMPHOCYTES # BLD AUTO: 2.42 X10(3) UL (ref 1–4)
LYMPHOCYTES # BLD AUTO: 2.49 X10(3) UL (ref 1–4)
LYMPHOCYTES NFR BLD AUTO: 13.8 %
LYMPHOCYTES NFR BLD AUTO: 18.1 %
LYMPHOCYTES NFR BLD AUTO: 28.3 %
LYMPHOCYTES NFR BLD AUTO: 30.3 %
LYMPHOCYTES NFR BLD AUTO: 31.6 %
M PROTEIN MFR SERPL ELPH: 7.3 G/DL (ref 6.4–8.2)
M PROTEIN MFR SERPL ELPH: 7.5 G/DL (ref 6.4–8.2)
MCH RBC QN AUTO: 29.5 PG (ref 26–34)
MCH RBC QN AUTO: 30 PG (ref 26–34)
MCH RBC QN AUTO: 30.4 PG (ref 26–34)
MCH RBC QN AUTO: 30.6 PG (ref 26–34)
MCH RBC QN AUTO: 30.8 PG (ref 26–34)
MCHC RBC AUTO-ENTMCNC: 31.7 G/DL (ref 31–37)
MCHC RBC AUTO-ENTMCNC: 31.7 G/DL (ref 31–37)
MCHC RBC AUTO-ENTMCNC: 33.2 G/DL (ref 31–37)
MCHC RBC AUTO-ENTMCNC: 33.8 G/DL (ref 31–37)
MCHC RBC AUTO-ENTMCNC: 33.8 G/DL (ref 31–37)
MCV RBC AUTO: 90.4 FL (ref 80–100)
MCV RBC AUTO: 90.6 FL (ref 80–100)
MCV RBC AUTO: 91 FL (ref 80–100)
MCV RBC AUTO: 93.1 FL (ref 80–100)
MCV RBC AUTO: 96 FL (ref 80–100)
MONOCYTES # BLD AUTO: 0.4 X10(3) UL (ref 0.1–1)
MONOCYTES # BLD AUTO: 0.41 X10(3) UL (ref 0.1–1)
MONOCYTES # BLD AUTO: 0.69 X10(3) UL (ref 0.1–1)
MONOCYTES # BLD AUTO: 0.87 X10(3) UL (ref 0.1–1)
MONOCYTES # BLD AUTO: 0.98 X10(3) UL (ref 0.1–1)
MONOCYTES NFR BLD AUTO: 10.4 %
MONOCYTES NFR BLD AUTO: 11.4 %
MONOCYTES NFR BLD AUTO: 11.9 %
MONOCYTES NFR BLD AUTO: 6.8 %
MONOCYTES NFR BLD AUTO: 7.3 %
NEUTROPHILS # BLD AUTO: 3.73 X10 (3) UL (ref 1.5–7.7)
NEUTROPHILS # BLD AUTO: 3.73 X10(3) UL (ref 1.5–7.7)
NEUTROPHILS # BLD AUTO: 4.16 X10 (3) UL (ref 1.5–7.7)
NEUTROPHILS # BLD AUTO: 4.16 X10(3) UL (ref 1.5–7.7)
NEUTROPHILS # BLD AUTO: 4.22 X10 (3) UL (ref 1.5–7.7)
NEUTROPHILS # BLD AUTO: 4.22 X10(3) UL (ref 1.5–7.7)
NEUTROPHILS # BLD AUTO: 4.25 X10 (3) UL (ref 1.5–7.7)
NEUTROPHILS # BLD AUTO: 4.25 X10(3) UL (ref 1.5–7.7)
NEUTROPHILS # BLD AUTO: 4.41 X10 (3) UL (ref 1.5–7.7)
NEUTROPHILS # BLD AUTO: 4.41 X10(3) UL (ref 1.5–7.7)
NEUTROPHILS NFR BLD AUTO: 53.5 %
NEUTROPHILS NFR BLD AUTO: 54.4 %
NEUTROPHILS NFR BLD AUTO: 55.9 %
NEUTROPHILS NFR BLD AUTO: 72.6 %
NEUTROPHILS NFR BLD AUTO: 75.7 %
NITRITE UR QL STRIP.AUTO: NEGATIVE
OSMOLALITY SERPL CALC.SUM OF ELEC: 289 MOSM/KG (ref 275–295)
OSMOLALITY SERPL CALC.SUM OF ELEC: 290 MOSM/KG (ref 275–295)
OSMOLALITY SERPL CALC.SUM OF ELEC: 291 MOSM/KG (ref 275–295)
P AXIS: 59 DEGREES
P-R INTERVAL: 264 MS
PH UR STRIP.AUTO: 9 [PH] (ref 4.5–8)
PLATELET # BLD AUTO: 132 10(3)UL (ref 150–450)
PLATELET # BLD AUTO: 133 10(3)UL (ref 150–450)
PLATELET # BLD AUTO: 136 10(3)UL (ref 150–450)
PLATELET # BLD AUTO: 172 10(3)UL (ref 150–450)
PLATELET # BLD AUTO: 214 10(3)UL (ref 150–450)
POTASSIUM SERPL-SCNC: 3.6 MMOL/L (ref 3.5–5.1)
POTASSIUM SERPL-SCNC: 4.1 MMOL/L (ref 3.5–5.1)
POTASSIUM SERPL-SCNC: 4.8 MMOL/L (ref 3.5–5.1)
PROT UR STRIP.AUTO-MCNC: NEGATIVE MG/DL
PROTHROMBIN TIME: 15 SECONDS (ref 11.8–14.5)
Q-T INTERVAL: 422 MS
QRS DURATION: 102 MS
QTC CALCULATION (BEZET): 483 MS
R AXIS: 39 DEGREES
RBC # BLD AUTO: 3.22 X10(6)UL (ref 3.8–5.8)
RBC # BLD AUTO: 3.46 X10(6)UL (ref 3.8–5.8)
RBC # BLD AUTO: 3.86 X10(6)UL (ref 3.8–5.8)
RBC # BLD AUTO: 4 X10(6)UL (ref 3.8–5.8)
RBC # BLD AUTO: 4.03 X10(6)UL (ref 3.8–5.8)
RBC #/AREA URNS AUTO: >10 /HPF
RH BLOOD TYPE: POSITIVE
SODIUM SERPL-SCNC: 139 MMOL/L (ref 136–145)
SODIUM SERPL-SCNC: 140 MMOL/L (ref 136–145)
SODIUM SERPL-SCNC: 141 MMOL/L (ref 136–145)
SP GR UR STRIP.AUTO: 1.01 (ref 1–1.03)
T AXIS: 24 DEGREES
UROBILINOGEN UR STRIP.AUTO-MCNC: <2 MG/DL
VENTRICULAR RATE: 79 BPM
WBC # BLD AUTO: 5.6 X10(3) UL (ref 4–11)
WBC # BLD AUTO: 5.9 X10(3) UL (ref 4–11)
WBC # BLD AUTO: 6.7 X10(3) UL (ref 4–11)
WBC # BLD AUTO: 7.7 X10(3) UL (ref 4–11)
WBC # BLD AUTO: 8.2 X10(3) UL (ref 4–11)
WBC #/AREA URNS AUTO: >50 /HPF

## 2019-01-01 PROCEDURE — 85610 PROTHROMBIN TIME: CPT | Performed by: HOSPITALIST

## 2019-01-01 PROCEDURE — 85025 COMPLETE CBC W/AUTO DIFF WBC: CPT | Performed by: PHYSICIAN ASSISTANT

## 2019-01-01 PROCEDURE — 86900 BLOOD TYPING SEROLOGIC ABO: CPT | Performed by: HOSPITALIST

## 2019-01-01 PROCEDURE — 97110 THERAPEUTIC EXERCISES: CPT

## 2019-01-01 PROCEDURE — 97530 THERAPEUTIC ACTIVITIES: CPT

## 2019-01-01 PROCEDURE — 83036 HEMOGLOBIN GLYCOSYLATED A1C: CPT | Performed by: HOSPITALIST

## 2019-01-01 PROCEDURE — 82962 GLUCOSE BLOOD TEST: CPT

## 2019-01-01 PROCEDURE — 88300 SURGICAL PATH GROSS: CPT | Performed by: ORTHOPAEDIC SURGERY

## 2019-01-01 PROCEDURE — 99284 EMERGENCY DEPT VISIT MOD MDM: CPT

## 2019-01-01 PROCEDURE — 87086 URINE CULTURE/COLONY COUNT: CPT | Performed by: EMERGENCY MEDICINE

## 2019-01-01 PROCEDURE — 84132 ASSAY OF SERUM POTASSIUM: CPT | Performed by: HOSPITALIST

## 2019-01-01 PROCEDURE — 99285 EMERGENCY DEPT VISIT HI MDM: CPT | Performed by: EMERGENCY MEDICINE

## 2019-01-01 PROCEDURE — 80053 COMPREHEN METABOLIC PANEL: CPT | Performed by: ANESTHESIOLOGY

## 2019-01-01 PROCEDURE — 85025 COMPLETE CBC W/AUTO DIFF WBC: CPT | Performed by: EMERGENCY MEDICINE

## 2019-01-01 PROCEDURE — 93010 ELECTROCARDIOGRAM REPORT: CPT | Performed by: EMERGENCY MEDICINE

## 2019-01-01 PROCEDURE — 97116 GAIT TRAINING THERAPY: CPT

## 2019-01-01 PROCEDURE — 85025 COMPLETE CBC W/AUTO DIFF WBC: CPT | Performed by: ANESTHESIOLOGY

## 2019-01-01 PROCEDURE — 72125 CT NECK SPINE W/O DYE: CPT | Performed by: EMERGENCY MEDICINE

## 2019-01-01 PROCEDURE — 0SP90JZ REMOVAL OF SYNTHETIC SUBSTITUTE FROM RIGHT HIP JOINT, OPEN APPROACH: ICD-10-PCS | Performed by: ORTHOPAEDIC SURGERY

## 2019-01-01 PROCEDURE — 86850 RBC ANTIBODY SCREEN: CPT | Performed by: HOSPITALIST

## 2019-01-01 PROCEDURE — 93005 ELECTROCARDIOGRAM TRACING: CPT

## 2019-01-01 PROCEDURE — 85025 COMPLETE CBC W/AUTO DIFF WBC: CPT | Performed by: HOSPITALIST

## 2019-01-01 PROCEDURE — 80053 COMPREHEN METABOLIC PANEL: CPT | Performed by: EMERGENCY MEDICINE

## 2019-01-01 PROCEDURE — 97162 PT EVAL MOD COMPLEX 30 MIN: CPT

## 2019-01-01 PROCEDURE — 99211 OFF/OP EST MAY X REQ PHY/QHP: CPT

## 2019-01-01 PROCEDURE — 96365 THER/PROPH/DIAG IV INF INIT: CPT | Performed by: EMERGENCY MEDICINE

## 2019-01-01 PROCEDURE — 86901 BLOOD TYPING SEROLOGIC RH(D): CPT | Performed by: HOSPITALIST

## 2019-01-01 PROCEDURE — 83735 ASSAY OF MAGNESIUM: CPT | Performed by: HOSPITALIST

## 2019-01-01 PROCEDURE — 88305 TISSUE EXAM BY PATHOLOGIST: CPT | Performed by: ORTHOPAEDIC SURGERY

## 2019-01-01 PROCEDURE — 80048 BASIC METABOLIC PNL TOTAL CA: CPT | Performed by: HOSPITALIST

## 2019-01-01 PROCEDURE — 70450 CT HEAD/BRAIN W/O DYE: CPT | Performed by: EMERGENCY MEDICINE

## 2019-01-01 PROCEDURE — 81001 URINALYSIS AUTO W/SCOPE: CPT | Performed by: EMERGENCY MEDICINE

## 2019-01-01 RX ORDER — DEXAMETHASONE SODIUM PHOSPHATE 4 MG/ML
VIAL (ML) INJECTION AS NEEDED
Status: DISCONTINUED | OUTPATIENT
Start: 2019-01-01 | End: 2019-01-01 | Stop reason: SURG

## 2019-01-01 RX ORDER — MORPHINE SULFATE 20 MG/ML
100 SOLUTION ORAL EVERY 4 HOURS PRN
Status: DISCONTINUED | OUTPATIENT
Start: 2019-01-01 | End: 2019-01-01

## 2019-01-01 RX ORDER — MAGNESIUM HYDROXIDE 1200 MG/15ML
LIQUID ORAL CONTINUOUS PRN
Status: COMPLETED | OUTPATIENT
Start: 2019-01-01 | End: 2019-01-01

## 2019-01-01 RX ORDER — ASPIRIN 325 MG
325 TABLET ORAL 2 TIMES DAILY
Status: DISCONTINUED | OUTPATIENT
Start: 2019-01-01 | End: 2019-01-01

## 2019-01-01 RX ORDER — HYDROMORPHONE HYDROCHLORIDE 1 MG/ML
0.4 INJECTION, SOLUTION INTRAMUSCULAR; INTRAVENOUS; SUBCUTANEOUS EVERY 5 MIN PRN
Status: DISCONTINUED | OUTPATIENT
Start: 2019-01-01 | End: 2019-01-01 | Stop reason: HOSPADM

## 2019-01-01 RX ORDER — CEFDINIR 300 MG/1
300 CAPSULE ORAL 2 TIMES DAILY
Qty: 14 CAPSULE | Refills: 0 | Status: SHIPPED | OUTPATIENT
Start: 2019-01-01 | End: 2019-01-01

## 2019-01-01 RX ORDER — GABAPENTIN 600 MG/1
600 TABLET ORAL
COMMUNITY
End: 2019-01-01

## 2019-01-01 RX ORDER — ACETAMINOPHEN 500 MG
1000 TABLET ORAL ONCE
Status: DISCONTINUED | OUTPATIENT
Start: 2019-01-01 | End: 2019-01-01

## 2019-01-01 RX ORDER — MORPHINE SULFATE 20 MG/ML
10 SOLUTION ORAL EVERY 4 HOURS PRN
Status: DISCONTINUED | OUTPATIENT
Start: 2019-01-01 | End: 2019-01-01

## 2019-01-01 RX ORDER — ONDANSETRON 2 MG/ML
4 INJECTION INTRAMUSCULAR; INTRAVENOUS EVERY 6 HOURS PRN
Status: DISCONTINUED | OUTPATIENT
Start: 2019-01-01 | End: 2019-01-01

## 2019-01-01 RX ORDER — NALOXONE HYDROCHLORIDE 0.4 MG/ML
80 INJECTION, SOLUTION INTRAMUSCULAR; INTRAVENOUS; SUBCUTANEOUS AS NEEDED
Status: DISCONTINUED | OUTPATIENT
Start: 2019-01-01 | End: 2019-01-01 | Stop reason: HOSPADM

## 2019-01-01 RX ORDER — POLYETHYLENE GLYCOL 3350 17 G/17G
17 POWDER, FOR SOLUTION ORAL DAILY PRN
Status: DISCONTINUED | OUTPATIENT
Start: 2019-01-01 | End: 2019-01-01

## 2019-01-01 RX ORDER — POTASSIUM CHLORIDE 1.5 G/1.77G
40 POWDER, FOR SOLUTION ORAL EVERY 4 HOURS
Status: COMPLETED | OUTPATIENT
Start: 2019-01-01 | End: 2019-01-01

## 2019-01-01 RX ORDER — GLYCOPYRROLATE 0.2 MG/ML
INJECTION INTRAMUSCULAR; INTRAVENOUS AS NEEDED
Status: DISCONTINUED | OUTPATIENT
Start: 2019-01-01 | End: 2019-01-01 | Stop reason: SURG

## 2019-01-01 RX ORDER — GABAPENTIN 600 MG/1
1200 TABLET ORAL 3 TIMES DAILY
COMMUNITY

## 2019-01-01 RX ORDER — MULTIVITAMIN WITH FOLIC ACID 400 MCG
1 TABLET ORAL DAILY
COMMUNITY

## 2019-01-01 RX ORDER — HALOPERIDOL 5 MG/ML
0.25 INJECTION INTRAMUSCULAR ONCE AS NEEDED
Status: DISCONTINUED | OUTPATIENT
Start: 2019-01-01 | End: 2019-01-01 | Stop reason: HOSPADM

## 2019-01-01 RX ORDER — DEXTROSE MONOHYDRATE 25 G/50ML
50 INJECTION, SOLUTION INTRAVENOUS
Status: DISCONTINUED | OUTPATIENT
Start: 2019-01-01 | End: 2019-01-01 | Stop reason: HOSPADM

## 2019-01-01 RX ORDER — TRAZODONE HYDROCHLORIDE 50 MG/1
50 TABLET ORAL NIGHTLY PRN
Status: DISCONTINUED | OUTPATIENT
Start: 2019-01-01 | End: 2019-01-01

## 2019-01-01 RX ORDER — ACETAMINOPHEN 325 MG/1
650 TABLET ORAL EVERY 6 HOURS PRN
Status: DISCONTINUED | OUTPATIENT
Start: 2019-01-01 | End: 2019-01-01

## 2019-01-01 RX ORDER — EPHEDRINE SULFATE 50 MG/ML
INJECTION, SOLUTION INTRAVENOUS AS NEEDED
Status: DISCONTINUED | OUTPATIENT
Start: 2019-01-01 | End: 2019-01-01 | Stop reason: SURG

## 2019-01-01 RX ORDER — CEFAZOLIN SODIUM/WATER 2 G/20 ML
2 SYRINGE (ML) INTRAVENOUS EVERY 8 HOURS
Status: COMPLETED | OUTPATIENT
Start: 2019-01-01 | End: 2019-01-01

## 2019-01-01 RX ORDER — FENTANYL 75 UG/H
1 PATCH TRANSDERMAL
Status: DISCONTINUED | OUTPATIENT
Start: 2019-01-01 | End: 2019-01-01

## 2019-01-01 RX ORDER — MORPHINE SULFATE 20 MG/ML
5 SOLUTION ORAL EVERY 4 HOURS PRN
Status: DISCONTINUED | OUTPATIENT
Start: 2019-01-01 | End: 2019-01-01

## 2019-01-01 RX ORDER — HYDROCODONE BITARTRATE AND ACETAMINOPHEN 5; 325 MG/1; MG/1
2 TABLET ORAL AS NEEDED
Status: DISCONTINUED | OUTPATIENT
Start: 2019-01-01 | End: 2019-01-01 | Stop reason: HOSPADM

## 2019-01-01 RX ORDER — CEFAZOLIN SODIUM/WATER 2 G/20 ML
2 SYRINGE (ML) INTRAVENOUS ONCE
Status: DISCONTINUED | OUTPATIENT
Start: 2019-01-01 | End: 2019-01-01

## 2019-01-01 RX ORDER — SODIUM CHLORIDE 9 MG/ML
INJECTION, SOLUTION INTRAVENOUS CONTINUOUS
Status: DISCONTINUED | OUTPATIENT
Start: 2019-01-01 | End: 2019-01-01

## 2019-01-01 RX ORDER — SENNOSIDES 8.6 MG
8.6 TABLET ORAL 2 TIMES DAILY PRN
Refills: 0 | Status: SHIPPED | COMMUNITY
Start: 2019-01-01

## 2019-01-01 RX ORDER — PROCHLORPERAZINE EDISYLATE 5 MG/ML
5 INJECTION INTRAMUSCULAR; INTRAVENOUS ONCE AS NEEDED
Status: DISCONTINUED | OUTPATIENT
Start: 2019-01-01 | End: 2019-01-01 | Stop reason: HOSPADM

## 2019-01-01 RX ORDER — NEOSTIGMINE METHYLSULFATE 0.5 MG/ML
INJECTION INTRAVENOUS AS NEEDED
Status: DISCONTINUED | OUTPATIENT
Start: 2019-01-01 | End: 2019-01-01 | Stop reason: SURG

## 2019-01-01 RX ORDER — SENNOSIDES 8.6 MG
8.6 TABLET ORAL 2 TIMES DAILY PRN
Status: DISCONTINUED | OUTPATIENT
Start: 2019-01-01 | End: 2019-01-01

## 2019-01-01 RX ORDER — DEXTROSE MONOHYDRATE 25 G/50ML
INJECTION, SOLUTION INTRAVENOUS
Status: DISCONTINUED
Start: 2019-01-01 | End: 2019-01-01

## 2019-01-01 RX ORDER — BISACODYL 10 MG
10 SUPPOSITORY, RECTAL RECTAL
Status: DISCONTINUED | OUTPATIENT
Start: 2019-01-01 | End: 2019-01-01

## 2019-01-01 RX ORDER — LORAZEPAM 1 MG/1
1 TABLET ORAL EVERY 6 HOURS PRN
Status: DISCONTINUED | OUTPATIENT
Start: 2019-01-01 | End: 2019-01-01

## 2019-01-01 RX ORDER — GABAPENTIN 600 MG/1
1200 TABLET ORAL 3 TIMES DAILY
Status: DISCONTINUED | OUTPATIENT
Start: 2019-01-01 | End: 2019-01-01

## 2019-01-01 RX ORDER — SODIUM CHLORIDE, SODIUM LACTATE, POTASSIUM CHLORIDE, CALCIUM CHLORIDE 600; 310; 30; 20 MG/100ML; MG/100ML; MG/100ML; MG/100ML
INJECTION, SOLUTION INTRAVENOUS CONTINUOUS
Status: DISCONTINUED | OUTPATIENT
Start: 2019-01-01 | End: 2019-01-01 | Stop reason: HOSPADM

## 2019-01-01 RX ORDER — ALFUZOSIN HYDROCHLORIDE 10 MG/1
10 TABLET, EXTENDED RELEASE ORAL
Status: DISCONTINUED | OUTPATIENT
Start: 2019-01-01 | End: 2019-01-01

## 2019-01-01 RX ORDER — ROCURONIUM BROMIDE 10 MG/ML
INJECTION, SOLUTION INTRAVENOUS AS NEEDED
Status: DISCONTINUED | OUTPATIENT
Start: 2019-01-01 | End: 2019-01-01 | Stop reason: SURG

## 2019-01-01 RX ORDER — POTASSIUM CHLORIDE 20 MEQ/1
40 TABLET, EXTENDED RELEASE ORAL EVERY 4 HOURS
Status: DISCONTINUED | OUTPATIENT
Start: 2019-01-01 | End: 2019-01-01

## 2019-01-01 RX ORDER — HYDROCODONE BITARTRATE AND ACETAMINOPHEN 5; 325 MG/1; MG/1
1 TABLET ORAL AS NEEDED
Status: DISCONTINUED | OUTPATIENT
Start: 2019-01-01 | End: 2019-01-01 | Stop reason: HOSPADM

## 2019-01-01 RX ORDER — HYDROMORPHONE HYDROCHLORIDE 1 MG/ML
0.6 INJECTION, SOLUTION INTRAMUSCULAR; INTRAVENOUS; SUBCUTANEOUS EVERY 5 MIN PRN
Status: DISCONTINUED | OUTPATIENT
Start: 2019-01-01 | End: 2019-01-01 | Stop reason: HOSPADM

## 2019-01-01 RX ORDER — SODIUM PHOSPHATE, DIBASIC AND SODIUM PHOSPHATE, MONOBASIC 7; 19 G/133ML; G/133ML
1 ENEMA RECTAL ONCE AS NEEDED
Status: DISCONTINUED | OUTPATIENT
Start: 2019-01-01 | End: 2019-01-01

## 2019-01-01 RX ORDER — SODIUM CHLORIDE 0.9 % (FLUSH) 0.9 %
3 SYRINGE (ML) INJECTION AS NEEDED
Status: DISCONTINUED | OUTPATIENT
Start: 2019-01-01 | End: 2019-01-01

## 2019-01-01 RX ORDER — SODIUM CHLORIDE 9 MG/ML
INJECTION, SOLUTION INTRAVENOUS CONTINUOUS PRN
Status: DISCONTINUED | OUTPATIENT
Start: 2019-01-01 | End: 2019-01-01 | Stop reason: SURG

## 2019-01-01 RX ORDER — MORPHINE SULFATE 4 MG/ML
4 INJECTION, SOLUTION INTRAMUSCULAR; INTRAVENOUS EVERY 10 MIN PRN
Status: DISCONTINUED | OUTPATIENT
Start: 2019-01-01 | End: 2019-01-01 | Stop reason: HOSPADM

## 2019-01-01 RX ORDER — METOPROLOL TARTRATE 5 MG/5ML
2.5 INJECTION INTRAVENOUS ONCE
Status: DISCONTINUED | OUTPATIENT
Start: 2019-01-01 | End: 2019-01-01 | Stop reason: HOSPADM

## 2019-01-01 RX ORDER — NORTRIPTYLINE HYDROCHLORIDE 25 MG/1
50 CAPSULE ORAL NIGHTLY
Status: DISCONTINUED | OUTPATIENT
Start: 2019-01-01 | End: 2019-01-01

## 2019-01-01 RX ORDER — METOCLOPRAMIDE HYDROCHLORIDE 5 MG/ML
10 INJECTION INTRAMUSCULAR; INTRAVENOUS EVERY 8 HOURS PRN
Status: DISCONTINUED | OUTPATIENT
Start: 2019-01-01 | End: 2019-01-01

## 2019-01-01 RX ORDER — CEFAZOLIN SODIUM/WATER 2 G/20 ML
2 SYRINGE (ML) INTRAVENOUS
Status: COMPLETED | OUTPATIENT
Start: 2019-01-01 | End: 2019-01-01

## 2019-01-01 RX ORDER — DEXTROSE MONOHYDRATE 25 G/50ML
50 INJECTION, SOLUTION INTRAVENOUS AS NEEDED
Status: DISCONTINUED | OUTPATIENT
Start: 2019-01-01 | End: 2019-01-01

## 2019-01-01 RX ORDER — SODIUM CHLORIDE 0.9 % (FLUSH) 0.9 %
10 SYRINGE (ML) INJECTION AS NEEDED
Status: DISCONTINUED | OUTPATIENT
Start: 2019-01-01 | End: 2019-01-01

## 2019-01-01 RX ORDER — GABAPENTIN 600 MG/1
1200 TABLET ORAL
COMMUNITY
End: 2019-01-01

## 2019-01-01 RX ORDER — DOCUSATE SODIUM 100 MG/1
100 CAPSULE, LIQUID FILLED ORAL 2 TIMES DAILY
Status: DISCONTINUED | OUTPATIENT
Start: 2019-01-01 | End: 2019-01-01

## 2019-01-01 RX ORDER — LIDOCAINE HYDROCHLORIDE 10 MG/ML
INJECTION, SOLUTION EPIDURAL; INFILTRATION; INTRACAUDAL; PERINEURAL AS NEEDED
Status: DISCONTINUED | OUTPATIENT
Start: 2019-01-01 | End: 2019-01-01 | Stop reason: SURG

## 2019-01-01 RX ORDER — ONDANSETRON 2 MG/ML
INJECTION INTRAMUSCULAR; INTRAVENOUS AS NEEDED
Status: DISCONTINUED | OUTPATIENT
Start: 2019-01-01 | End: 2019-01-01 | Stop reason: SURG

## 2019-01-01 RX ORDER — ONDANSETRON 2 MG/ML
4 INJECTION INTRAMUSCULAR; INTRAVENOUS ONCE AS NEEDED
Status: DISCONTINUED | OUTPATIENT
Start: 2019-01-01 | End: 2019-01-01 | Stop reason: HOSPADM

## 2019-01-01 RX ORDER — PHENYLEPHRINE HCL 10 MG/ML
VIAL (ML) INJECTION AS NEEDED
Status: DISCONTINUED | OUTPATIENT
Start: 2019-01-01 | End: 2019-01-01 | Stop reason: SURG

## 2019-01-01 RX ORDER — MORPHINE SULFATE 10 MG/ML
6 INJECTION, SOLUTION INTRAMUSCULAR; INTRAVENOUS EVERY 10 MIN PRN
Status: DISCONTINUED | OUTPATIENT
Start: 2019-01-01 | End: 2019-01-01 | Stop reason: HOSPADM

## 2019-01-01 RX ORDER — PANTOPRAZOLE SODIUM 20 MG/1
20 TABLET, DELAYED RELEASE ORAL DAILY
Status: DISCONTINUED | OUTPATIENT
Start: 2019-01-01 | End: 2019-01-01

## 2019-01-01 RX ORDER — TAMSULOSIN HYDROCHLORIDE 0.4 MG/1
0.4 CAPSULE ORAL DAILY
Qty: 30 CAPSULE | Refills: 0 | Status: SHIPPED | OUTPATIENT
Start: 2019-01-01

## 2019-01-01 RX ORDER — HYDROMORPHONE HYDROCHLORIDE 1 MG/ML
0.2 INJECTION, SOLUTION INTRAMUSCULAR; INTRAVENOUS; SUBCUTANEOUS EVERY 5 MIN PRN
Status: DISCONTINUED | OUTPATIENT
Start: 2019-01-01 | End: 2019-01-01 | Stop reason: HOSPADM

## 2019-01-01 RX ORDER — MORPHINE SULFATE 2 MG/ML
2 INJECTION, SOLUTION INTRAMUSCULAR; INTRAVENOUS EVERY 10 MIN PRN
Status: DISCONTINUED | OUTPATIENT
Start: 2019-01-01 | End: 2019-01-01 | Stop reason: HOSPADM

## 2019-01-01 RX ORDER — ESCITALOPRAM OXALATE 20 MG/1
20 TABLET ORAL DAILY
Status: DISCONTINUED | OUTPATIENT
Start: 2019-01-01 | End: 2019-01-01

## 2019-01-01 RX ORDER — ESCITALOPRAM OXALATE 20 MG/1
20 TABLET ORAL DAILY
Refills: 0 | Status: SHIPPED | COMMUNITY
Start: 2019-01-01

## 2019-01-01 RX ORDER — DEXTROSE MONOHYDRATE 25 G/50ML
50 INJECTION, SOLUTION INTRAVENOUS
Status: DISCONTINUED | OUTPATIENT
Start: 2019-01-01 | End: 2019-01-01

## 2019-01-01 RX ORDER — OXYBUTYNIN CHLORIDE 10 MG/1
10 TABLET, EXTENDED RELEASE ORAL DAILY
Status: DISCONTINUED | OUTPATIENT
Start: 2019-01-01 | End: 2019-01-01

## 2019-01-01 RX ORDER — SODIUM CHLORIDE, SODIUM LACTATE, POTASSIUM CHLORIDE, CALCIUM CHLORIDE 600; 310; 30; 20 MG/100ML; MG/100ML; MG/100ML; MG/100ML
INJECTION, SOLUTION INTRAVENOUS CONTINUOUS
Status: DISCONTINUED | OUTPATIENT
Start: 2019-01-01 | End: 2019-01-01

## 2019-01-01 RX ORDER — GABAPENTIN 600 MG/1
600 TABLET ORAL NIGHTLY
COMMUNITY
End: 2019-01-01

## 2019-01-01 RX ORDER — FENTANYL 75 UG/H
1 PATCH TRANSDERMAL
Refills: 0 | Status: SHIPPED | COMMUNITY
Start: 2019-01-01

## 2019-01-01 RX ADMIN — GLYCOPYRROLATE 0.2 MG: 0.2 INJECTION INTRAMUSCULAR; INTRAVENOUS at 07:40:00

## 2019-01-01 RX ADMIN — CEFAZOLIN SODIUM/WATER 2 G: 2 G/20 ML SYRINGE (ML) INTRAVENOUS at 07:58:00

## 2019-01-01 RX ADMIN — LIDOCAINE HYDROCHLORIDE 30 MG: 10 INJECTION, SOLUTION EPIDURAL; INFILTRATION; INTRACAUDAL; PERINEURAL at 07:40:00

## 2019-01-01 RX ADMIN — NEOSTIGMINE METHYLSULFATE 3 MG: 0.5 INJECTION INTRAVENOUS at 08:37:00

## 2019-01-01 RX ADMIN — DEXAMETHASONE SODIUM PHOSPHATE 4 MG: 4 MG/ML VIAL (ML) INJECTION at 07:58:00

## 2019-01-01 RX ADMIN — SODIUM CHLORIDE: 9 INJECTION, SOLUTION INTRAVENOUS at 07:35:00

## 2019-01-01 RX ADMIN — EPHEDRINE SULFATE 10 MG: 50 INJECTION, SOLUTION INTRAVENOUS at 07:56:00

## 2019-01-01 RX ADMIN — GLYCOPYRROLATE 0.6 MG: 0.2 INJECTION INTRAMUSCULAR; INTRAVENOUS at 08:37:00

## 2019-01-01 RX ADMIN — PHENYLEPHRINE HCL 50 MCG: 10 MG/ML VIAL (ML) INJECTION at 08:04:00

## 2019-01-01 RX ADMIN — SODIUM CHLORIDE: 9 INJECTION, SOLUTION INTRAVENOUS at 08:38:00

## 2019-01-01 RX ADMIN — ONDANSETRON 4 MG: 2 INJECTION INTRAMUSCULAR; INTRAVENOUS at 07:58:00

## 2019-01-01 RX ADMIN — EPHEDRINE SULFATE 10 MG: 50 INJECTION, SOLUTION INTRAVENOUS at 07:59:00

## 2019-01-01 RX ADMIN — ROCURONIUM BROMIDE 25 MG: 10 INJECTION, SOLUTION INTRAVENOUS at 07:40:00

## 2019-03-05 NOTE — H&P
[address]    PATIENT'S NAME: Greg Sharma   ATTENDING PHYSICIAN: Karime Hilario M.D.    PATIENT ACCOUNT#:   [de-identified]    LOCATION:  [facility]  MEDICAL RECORD #:   [id number]       YOB: 1935  ADMISSION DATE:       03/08/2019 proximal thigh. There is varus angulation of the fracture and anterior angulation of 45 degrees. The patient found this hardware painful at that time, and I did schedule him for surgery on October 26, 2018.   Surgery was cancelled, however, as his wife wa brain tumor, nasal endoscopy, excision of lesion of the tendon, soft tissue transfer, colonoscopy, radical prostatectomy, cataract surgery, cholecystectomy, bilateral foot surgery, tonsillectomy.      MEDICATIONS:  Metformin, Toprol XL, Ditropan XL, Prilose

## 2019-03-08 NOTE — HOSPICE RN NOTE
Called by  Hansel about patient. Mr. Lupe Samson had been on Routine level of care hospice at home and had revoked hospice for surgical repair of hip hardware. At pre-op visit, he was noted to have AMS and is now in ER for evaluation.   Plan is to re

## 2019-03-08 NOTE — OR PREOP
Pt arrived with glucoscan 54. Pt is very sleepy. Disoriented to time and place. Glucoscan 129 after 1amp D50W given. Pt's wife states pt fell 4 times this morning at home due to his weakness and confusion.  Vitals signs  O2 sat 94%,T 98.3,P 60, R 14,

## 2019-03-08 NOTE — ED INITIAL ASSESSMENT (HPI)
Pt was seen today at surgical center for removal of hardware to right femur fx after fall. Pt mentation was altered and BS was 51. D50 given and BS increased to 120's. Sx was canceled and pt was sent here.   Pt orientation has returned to normal per fami

## 2019-03-08 NOTE — ED PROVIDER NOTES
Patient Seen in: [facility] Emergency Department    History   Patient presents with:  Altered Mental Status (neurologic)  Hypoglycemia (metabolic)    Stated Complaint: low BS and altered mental status    HPI    This is a an 53-year-old male in hospi Neuropathy   • Other vitamin B12 deficiency anemia 7/7/2008   • PARESTHESIA LEGS AND FEET 12/11/2008   • Peripheral Neuropathy 6/7/2010    uses cane   • Personal history of malignant neoplasm of prostate 7/7/2008   • Prostate cancer ([facility] [address]    • S/P radia Right knee surgery   • SKIN SURGERY  5/1/13    MMS to the L lower preauricular ear for SCC-well diff, super, invasive   • SPECIAL SERVICE OR REPORT  2004    Radical prostatectomy   • TONSILLECTOMY     • TOTAL HIP REPLACEMENT             Social History Urine Moderate (*)     pH Urine 9.0 (*)     Leukocyte Esterase Urine Large (*)     WBC Urine >50 (*)     RBC URINE >10 (*)     Bacteria Urine 1+ (*)     All other components within normal limits   COMP METABOLIC PANEL (14) - Abnormal; Notable for the follo Hospice was discontinued when patient was scheduled for outpatient surgery. Currently seeing if she can reinstate hospice so patient can be discharged home. Plan to send patient home. Hospice reinstated. The family is aware of plan.   Discharged via a

## 2019-06-12 PROBLEM — C79.9 METASTATIC ADENOCARCINOMA (HCC): Status: ACTIVE | Noted: 2019-01-01

## 2019-06-15 NOTE — ED PROVIDER NOTES
Patient Seen in: [facility] Emergency Department    History   Patient presents with:  Fall (musculoskeletal, neurologic)    Stated Complaint: fall    HPI    42-year-old male comes to the hospital after having had a fall.   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   PATIENT STATED HISTORY: (As transcribed by Technologist)  Patient presents after falling twice today in the sinuses as described also. 2. No acute intracranial hemorrhage, mass effect or midline shift. No depressed skull fracture. 3. Chronic sinusitis changes.     Dictated by: Erna Tavarez MD on 6/15/2019 at 18:52     Approved by: Erna Tavarez, C4-5 and C5-6. CONCLUSION:  1. No acute fracture or dislocation. 2. Spinal canal stenosis at C4-5. 3. Multilevel neural foraminal narrowing and left lateral recess narrowing as described above. 4. Severe degenerative changes.  5. Exam limited by motion

## 2019-06-15 NOTE — ED INITIAL ASSESSMENT (HPI)
Pt has fallen twice today, first slipped from wheelchair onto floor without injury, fell second time in bathroom when pt fell asleep. Pt is scheduled to have a femur fracture repair this upcoming week. On hospice secondary to brain and prostate cancer.  Pt

## 2019-06-15 NOTE — ED NOTES
Per patient approval, I spoke with Lio York pt's wife and updated her on patient's condition. Awaiting test results at this time.

## 2019-06-18 PROBLEM — I10 DIABETES MELLITUS WITH COINCIDENT HYPERTENSION (HCC): Status: ACTIVE | Noted: 2019-01-01

## 2019-06-18 PROBLEM — R29.6 RECURRENT FALLS: Status: ACTIVE | Noted: 2019-01-01

## 2019-06-18 PROBLEM — E11.9 DIABETES MELLITUS WITH COINCIDENT HYPERTENSION (HCC): Status: ACTIVE | Noted: 2019-01-01

## 2019-06-25 NOTE — H&P (VIEW-ONLY)
[facility]    PATIENT'S NAME: Pam Flor   ATTENDING PHYSICIAN: Richard Segura MD   PATIENT ACCOUNT#:   [id number]    LOCATION:  [facility]  MEDICAL RECORD #:   [id number]       YOB: 1935  ADMISSION DATE:       06/ HISTORY:  No tobacco since 75. No alcohol used. PHYSICAL EXAMINATION:    GENERAL:  An elderly male. Alert. VITAL SIGNS:  Blood pressure of 114/72, pulse of 80, weighs 163 pounds, 5 feet 10 inches, with a BMI of 25. HEENT:  No acute abnormalities.

## 2019-06-26 PROBLEM — S72.009A HIP FRACTURE (HCC): Status: ACTIVE | Noted: 2019-01-01

## 2019-06-26 NOTE — CM/SW NOTE
Called and spoke with patient's wife, Mecca Morin regarding scheduled direct admission time today. [facility] informed her that [facility] will probably not have a bed for patient until this afternoon.   She stated that patient's hospice will be revoked today and that all

## 2019-06-26 NOTE — CONSULTS
[facility]    History & Physical (or consult)    Patrick Preciado Patient Status:  Inpatient    10/19/1935 MRN [id number]   Location [facility] 4W/SW/SE Attending Jaun Ace MD   Hosp Day # 0 Osteoarthrosis, unspecified whether generalized or localized, unspecified site    • Other and unspecified hyperlipidemia 7/7/2008   • Other and unspecified personal history of malignant neoplasm 2004    Prostate cancer.   yan 3,4   • OTHER DISEASES endoscopic resection of brain tumor (esthesioneuroblastoma) at . [address] 50 8/13/10 AND BRAIN HEMORRHAGE AFTER THAT. WAS IN [facility] AND [facility]   • OTHER SURGICAL HISTORY      Bilateral foot surgery   • OTHER SURG by mouth every 4 (four) hours as needed for Pain. B Complex Vitamins (VITAMIN B COMPLEX OR) Take 1 tablet by mouth daily. Nortriptyline HCl 25 MG Oral Cap Take 50 mg by mouth nightly. Omeprazole 20 MG Oral Tab EC Take 20 mg by mouth daily. 12.0 06/19/2019     06/19/2019    K 4.40 06/19/2019     (L) 06/19/2019    CO2 31.1 06/19/2019     (H) 06/19/2019    CA 9.0 03/08/2019    ALB 3.3 (L) 06/19/2019    ALKPHO 143 (H) 06/19/2019    BILT 0.44 06/19/2019    TP 7.7 06/19/2019 and listed in epic, but he is only on fentanyl 75 mcg patch, so 100 mg morphine dose seems excessive    Will continue to follow while hospitalized. Please contact me or the on call [facility] hospitalist at [phone] with any questions/concerns.     Micah Akers

## 2019-06-26 NOTE — CM/SW NOTE
Spoke to Memo Fofana RN OR to confirm patient's pre-admission date is today 6/26/19, for surgery to k[place] 6/27/19 with Dr. Kelsey Iraheta.    Spoke with STEPHEN and was informed that we have no beds available this morning so EDCM entered direct admit for this afternoon at

## 2019-06-27 PROBLEM — Z96.9 RETAINED ORTHOPEDIC HARDWARE: Status: ACTIVE | Noted: 2019-01-01

## 2019-06-27 NOTE — ANESTHESIA PREPROCEDURE EVALUATION
Anesthesia PreOp Note    HPI:     Fito Cadena is a 82year old male who presents for preoperative consultation requested by: Urban Garcia MD    Date of Surgery: 6/26/2019 - 6/27/2019    Procedure(s):  EXTREMITY LOWER HARDWARE REMOVAL  Mamta (monoclonal gammopathy of unknown significance)         Date Noted: 02/03/2010      Other vitamin B12 deficiency anemia         Date Noted: 07/07/2008      Esophageal reflux         Date Noted: 07/07/2008        Past Medical History:   Diagnosis Date   • A Past Surgical History:   Procedure Laterality Date   • BRAIN SURGERY     • CATARACT     • CHOLECYSTECTOMY     • COLONOSCOPY  7/9/2004    UGI 4/16/07   • COLONOSCOPY     • COLONOSCOPY, POSSIBLE BIOPSY, POSSIBLE POLYPECTOMY 48961 N/A 8/5/2014    Perfor Disp:  Rfl:  Past Week at Unknown time   gabapentin 600 MG Oral Tab Take 1,200 mg by mouth 3 (three) times daily. Disp:  Rfl:  6/26/2019 at Unknown time   Ergocalciferol (VITAMIN D OR) Take 1 capsule by mouth daily.  Disp:  Rfl:  Past Week at Unknown time 10 mg Oral Daily PRN Rolly Gama MD    escitalopram (LEXAPRO) tab 20 mg 20 mg Oral Daily Rolly Gama MD    fentaNYL (DURAGESIC) 75 MCG/HR 1 patch 1 patch Transdermal Q72H Rolly Gama MD    gabapentin (NEURONTIN) tab 1,200 mg 1 Stephane Armendariz MD    Morphine Sulfate (Concentrate) concentrated solution 5 mg 5 mg Oral Q4H PRN Stephane Armendariz MD 5 mg at 06/27/19 0812     No current [facility]-ordered outpatient medications on file.       Contrast Dye [Gadol*    ITCHING    Famil Not on file    Social History Narrative      Retired. . 5 children. Available pre-op labs reviewed.   Lab Results   Component Value Date    WBC 8.2 06/27/2019    WBC 7.80 06/19/2019    RBC 4.03 06/27/2019    RBC 4.07 06/19/2019    HGB 12.1 (L) (+) GERD,     Comments: Metastatic cancer    Endo/Other    (+) diabetes mellitus poorly controlled,   Abdominal  - normal exam               Anesthesia Plan:   ASA:  4  Plan:   General  Airway:  ETT  Post-op Pain Management: IV analgesics  Informed Conse

## 2019-06-27 NOTE — BRIEF OP NOTE
Pre-Operative Diagnosis: Retained orthopedic hardware [Z96.9]     Post-Operative Diagnosis: Retained orthopedic hardware [Z96.9]      Procedure Performed:   Procedure(s):  RIGHT HIP HARDWARE REMOVAL      Surgeon(s) and Role:     * Obey Soriano MD -

## 2019-06-27 NOTE — INTERVAL H&P NOTE
Pre-op Diagnosis: Retained orthopedic hardware [Z96.9]    The above referenced H&P was reviewed by Titi Hernandes MD on 6/27/2019, the patient was examined and no significant changes have occurred in the patient's condition since the H&P was performed.

## 2019-06-27 NOTE — ANESTHESIA PROCEDURE NOTES
Airway  Urgency: elective    Airway not difficult    General Information and Staff    Patient location during procedure: OR  Anesthesiologist: Abhishek Nicole MD  Resident/CRNA: Adrian Aguirre CRNA  Performed: anesthesiologist and CRNA     Jazmin Dick

## 2019-06-27 NOTE — CM/SW NOTE
SW met with the patient at bedside. The patient lives in 1 level house with 3 steps to enter with family.  The patient responds not being independent prior to admission with all ADL's and  ambulation , his family members were helping him, he does not  drive

## 2019-06-27 NOTE — WOUND PROGRESS NOTE
Wound care saw pt, sitting up in bed. Pt states that his \"back side is a bit painful, has been for months\". Pt agreeable, able to turn to his side for assessment. There is a scab to the mid sacrum, partially removed.  Dry intact, pink, blanching skin surr

## 2019-06-27 NOTE — ANESTHESIA POSTPROCEDURE EVALUATION
Patient: Za Yancey    Procedure Summary     Date:  06/27/19 Room / Location:  [facility] OR  / [facility] OR    Anesthesia Start:  4768 Anesthesia Stop:      Procedure:  EXTREMITY LOWER HARDWARE REMOVAL (Right Hip) Diagnosis:       Retained ortho

## 2019-06-27 NOTE — PROGRESS NOTES
[facility]    Progress Note    Nany Lylesregis Patient Status:  Inpatient    10/19/1935 MRN [id number]   Location [facility] 4W/SW/SE Attending Horacio Snow MD   Hosp Day # 1 PCP Kelsea Tyson MD 10 mL 10 mL Intravenous PRN   0.9% NaCl infusion  Intravenous Continuous   docusate sodium (COLACE) cap 100 mg 100 mg Oral BID   PEG 3350 (MIRALAX) powder packet 17 g 17 g Oral Daily PRN   ondansetron HCl (ZOFRAN) injection 4 mg 4 mg Intravenous Q6H PRN GFRAA 115   GFRNAA 99   CA 9.3      K 3.6      CO2 29.0       TSH (uIU/mL)   Date Value   12/11/2008 2.169   07/07/2008 3.281     Lipase, Serum (U/L)   Date Value   03/06/2007 45     No results for input(s): TROP, CK in the last 168 hours.

## 2019-06-27 NOTE — PHYSICAL THERAPY NOTE
PHYSICAL THERAPY EVALUATION - INPATIENT     Room Number: 407/407-A  Evaluation Date: 6/27/2019  Type of Evaluation: Initial   Physician Order: PT Eval and Treat    Presenting Problem: Right hip fracture s/p femur hardware removal  Reason for Therapy: Himanshu conservation;Patient education; Family education;Strengthening;Transfer training;Balance training  Rehab Potential : Good  Frequency (Obs): Daily       PHYSICAL THERAPY MEDICAL/SOCIAL HISTORY     History related to current admission: Patient with hx of visi unspecified 8/22/2008   • Type II or unspecified type diabetes mellitus without mention of complication, not stated as uncontrolled    • Unspecified essential hypertension    • Unspecified vitamin D deficiency 1/19/2009   • Visual impairment     glasses Spouse; Family  Drives: No  Patient Owned Equipment: (hospital bed, walker, transport wheelchair)  Patient Regularly Uses: Hearing aides    Prior Level of Piatt: Prior to admission, patient was performing pivot transfers with assist from family bed>c Total     AM-PAC Score:  Raw Score: 15   Approx Degree of Impairment: 57.7%   Standardized Score (AM-PAC Scale): 39.45   CMS Modifier (G-Code): CK    FUNCTIONAL ABILITY STATUS  Gait Assessment   Gait Assistance: Not tested           Stoop/Curb Assistance:

## 2019-06-27 NOTE — PROGRESS NOTES
Up with 2 max assist to turn pivot to rolling commode/wheelchair, voiding, alert and oriented, pleasant and cooperative.  [place] with bilateral hearing aids in ears on arrival, upper and lower partials in on arrival, glasses on on arrival.  Orders received and

## 2019-06-28 PROBLEM — E46 MALNUTRITION (HCC): Status: ACTIVE | Noted: 2019-01-01

## 2019-06-28 NOTE — SPIRITUAL CARE NOTE
PT needs a face to face prior to [facility] readmit. Pt signed consents for possible admit on Sunday 6/30/19.  Report made to FSN and BRIONNA SupV and Team Leads

## 2019-06-28 NOTE — PLAN OF CARE
Problem: Diabetes/Glucose Control  Goal: Glucose maintained within prescribed range  Description  INTERVENTIONS:  - Monitor Blood Glucose as ordered  - Assess for signs and symptoms of hyperglycemia and hypoglycemia  - Administer ordered medications to m Problem: SAFETY ADULT - FALL  Goal: Free from fall injury  Description  INTERVENTIONS:  - Assess pt frequently for physical needs  - Identify cognitive and physical deficits and behaviors that affect risk of falls.   - [place] fall precautions as indica dose s/s ACHS, carried out, see MAR. Fall precautions maintained. Bed locked and in lowest position, uses call light approprietly, call light and belonging in reach, needs attended to.

## 2019-06-28 NOTE — CM/SW NOTE
SW [facility] to [facility], they will meet with the patient as soon as they can, resume Hospice order has been entered. New POLST form presented to the patient, the one that's scanned into the system is missing MD's signature.      POLST

## 2019-06-28 NOTE — PHYSICAL THERAPY NOTE
PHYSICAL THERAPY TREATMENT NOTE - INPATIENT     Room Number: 407/407-A       Presenting Problem: Right hip fracture s/p femur hardware removal    Problem List  Active Problems:    Hip fracture [facility])    Retained orthopedic hardware    Malnutrition ([facility] 75.) mechanics;Repositioning    BALANCE                                                                                                                     Static Sitting: Fair +  Dynamic Sitting: Fair +           Static Standing: Fair -  Dynamic Standing: Poor sit to stand with rw and cga. Did not attempt SPT this session   Goal #3     Goal #3   Current Status  Pt will demo amb with rw, 5' x 1 with cga. wbat rt le.     Goal #4     Goal #4   Current Status     Goal #5 Patient to demonstrate independence with home

## 2019-06-28 NOTE — OPERATIVE REPORT
[facility]    PATIENT'S NAME: Genny Mansfield   ATTENDING PHYSICIAN: Richard Munoz MD   OPERATING PHYSICIAN: Richard Munoz MD   PATIENT ACCOUNT#:   [id number]    LOCATION:  [facility] [address] PACU 7 [facility] 10  MEDICAL RECORD #:   [id number] pressure points well padded. The right lower extremity was prepped and draped in the usual sterile fashion. A new incision was made over the prominent hardware over the anterolateral aspect of the proximal thigh extending approximately 10 cm.   Skin and s Richard West MD  d: 06/27/2019 08:48:36  t: 06/27/2019 09:08:14  [facility] [id number]  [facility]/

## 2019-06-28 NOTE — PROGRESS NOTES
POD#1 s/p Right femur hardware removal  Good pain control   Alert, no nausea    Lab stable    Dressing clean and dry  DNVI  Calf's non tender    Imp: POD#1 s/p Right femur hardware removal  Ortho stable for DC home    Plan: Dressing change in 7 days  Weigh

## 2019-06-29 NOTE — PHYSICAL THERAPY NOTE
PHYSICAL THERAPY TREATMENT NOTE - INPATIENT     Room Number: 407/407-A       Presenting Problem: Right hip fracture s/p femur hardware removal    Problem List  Active Problems:    Hip fracture [facility])    Retained orthopedic hardware    Malnutrition ([facility] 75.) Static Sitting: Good  Dynamic Sitting: Fair +           Static Standing: Fair -  Dynamic Standing: Fair -    ACTIVITY TOLERANCE                         O2 WALK                  AM-PAC '6-Clicks' INPATIENT min A with the RW   Goal #3     Goal #3   Current Status  Pt will demo amb with rw, 5' x 1 with cga. wbat rt le.     Goal #4  Pt AMB 3' with the RW CGA   Goal #4   Current Status     Goal #5 Patient to demonstrate independence with home activity/exercise in

## 2019-06-29 NOTE — PLAN OF CARE
Problem: Diabetes/Glucose Control  Goal: Glucose maintained within prescribed range  Description  INTERVENTIONS:  - Monitor Blood Glucose as ordered  - Assess for signs and symptoms of hyperglycemia and hypoglycemia  - Administer ordered medications to m Problem: SAFETY ADULT - FALL  Goal: Free from fall injury  Description  INTERVENTIONS:  - Assess pt frequently for physical needs  - Identify cognitive and physical deficits and behaviors that affect risk of falls.   - [place] fall precautions as indica Sunday when medically stable.

## 2019-06-29 NOTE — PROGRESS NOTES
[address]    Progress Note    Kipxavier Blake Patient Status:  Inpatient    10/19/1935 MRN [id number]   Location [facility] 4W/SW/SE Attending Annamaria Laura MD   Hosp Day # 3 PCP Henok Lofton MD Medications:  dextrose 50 % injection 50 mL 50 mL Intravenous PRN   Glucose-Vitamin C (DEX-4) 4-6 GM-MG chewable tab 4 tablet 4 tablet Oral Q15 Min PRN   glucose (DEX4) oral liquid 15 g 15 g Oral Q15 Min PRN   Normal Saline Flush 0.9 % injection 10 mL 10 m 06/28/19  0443 06/29/19  0437   RBC 4.03 3.46* 3.22*   HGB 12.1* 10.2* 9.8*   HCT 36.5* 32.2* 30.9*   MCV 90.6 93.1 96.0   MCH 30.0 29.5 30.4   MCHC 33.2 31.7 31.7   RDW 13.4 13.7 13.9   NEPRELIM 4.41 4.16 3.73   WBC 8.2 7.7 6.7   .0 172.0 136.0*

## 2019-06-29 NOTE — PLAN OF CARE
Problem: Diabetes/Glucose Control  Goal: Glucose maintained within prescribed range  Description  INTERVENTIONS:  - Monitor Blood Glucose as ordered  - Assess for signs and symptoms of hyperglycemia and hypoglycemia  - Administer ordered medications to m Problem: SAFETY ADULT - FALL  Goal: Free from fall injury  Description  INTERVENTIONS:  - Assess pt frequently for physical needs  - Identify cognitive and physical deficits and behaviors that affect risk of falls.   - [place] fall precautions as indica knowledge, values, beliefs, and cultural backgrounds into the planning and delivery of care  - Encourage patient/family to participate in care and decision-making at the level they choose  - Honor patient and family perspectives and choices   Outcome: Prog

## 2019-06-29 NOTE — HOSPICE RN NOTE
Met pt with Dr Hany Nixon for face to face. Pt appears comfortable. No complaints of pain. Asking about orthotic shoe for discharge. Spoke with RN about this. Plan is to go home tomorrow via ambulance. Will order meds tomorrow.

## 2019-06-29 NOTE — PROGRESS NOTES
[address]    Progress Note    Soila Patel Patient Status:  Inpatient    10/19/1935 MRN [id number]   Location [facility] 4W/SW/SE Attending Peter Grubbs MD   Hosp Day # 2 PCP Shy Balderrama MD injection 50 mL 50 mL Intravenous PRN   Glucose-Vitamin C (DEX-4) 4-6 GM-MG chewable tab 4 tablet 4 tablet Oral Q15 Min PRN   glucose (DEX4) oral liquid 15 g 15 g Oral Q15 Min PRN   Normal Saline Flush 0.9 % injection 10 mL 10 mL Intravenous PRN   0.9% NaC HGB 12.1* 10.2*   HCT 36.5* 32.2*   MCV 90.6 93.1   MCH 30.0 29.5   MCHC 33.2 31.7   RDW 13.4 13.7   NEPRELIM 4.41 4.16   WBC 8.2 7.7   .0 172.0       Recent Labs   Lab 06/27/19  0455 06/27/19 2047 06/28/19  0443   GLU 91  --   --    BUN 11  --

## 2019-06-30 NOTE — PROGRESS NOTES
[facility]    Progress Note    Monajim Rodney Patient Status:  Inpatient    10/19/1935 MRN [id number]   Location [facility] 4W/SW/SE Attending Danny Cooney MD   Hosp Day # 4 PCP Britt Masters MD     SUBJECTIVE:  Caroline [place]

## 2019-06-30 NOTE — HOSPICE RN NOTE
Saw pt eating breakfast this morning. States his pain is fine today. Ready to go home. Set up [address] for 1pm; confirmed with pt that people will be at home to help him into his house. POC updated with Alma Diaz wife, and hospital RN.

## 2019-06-30 NOTE — PLAN OF CARE
Problem: Diabetes/Glucose Control  Goal: Glucose maintained within prescribed range  Description  INTERVENTIONS:  - Monitor Blood Glucose as ordered  - Assess for signs and symptoms of hyperglycemia and hypoglycemia  - Administer ordered medications to m Problem: SAFETY ADULT - FALL  Goal: Free from fall injury  Description  INTERVENTIONS:  - Assess pt frequently for physical needs  - Identify cognitive and physical deficits and behaviors that affect risk of falls.   - [place] fall precautions as indica knowledge, values, beliefs, and cultural backgrounds into the planning and delivery of care  - Encourage patient/family to participate in care and decision-making at the level they choose  - Honor patient and family perspectives and choices   Outcome: Prog

## 2019-06-30 NOTE — CM/SW NOTE
SW confirmed with [facility] that they have the patient sent up to [place] home with [facility]  at 1 pm today, 6.30.19    POLST form in the chart, MD signature needed.       [place], [address] [facility]

## 2019-06-30 NOTE — PHYSICAL THERAPY NOTE
PHYSICAL THERAPY TREATMENT NOTE - INPATIENT     Room Number: 407/407-A       Presenting Problem: Right hip fracture s/p femur hardware removal    Problem List  Active Problems:    Hip fracture [facility])    Retained orthopedic hardware    Malnutrition ([facility] 75.) Static Standing: Fair -  Dynamic Standing: Fair -    ACTIVITY TOLERANCE                         O2 WALK                  AM-PAC '6-Clicks' INPATIENT SHORT FORM - BASIC MOBILITY  How much difficulty does the patient currently have. ..  -   Turning over in with the RW CGA   Goal #4   Current Status     Goal #5 Patient to demonstrate independence with home activity/exercise instructions provided to patient in preparation for discharge.    Goal #5   Current Status Educated and performed   Goal #6     Goal #6  C

## 2019-06-30 NOTE — DISCHARGE SUMMARY
[address]    Discharge Summary    Smith Curry Patient Status:  Inpatient    10/19/1935 MRN [id number]   Location [facility] 4W/SW/SE Attending Lane Cote MD   Hosp Day # 4 PCP Gilbert Nogueira is on a fentanyl patch at home and morphine 100mg q4h prn     Currently comfortable. No complaints. Retired  and suni at [employer] Course: unremarkable post op course. Did well.   Hospice re-ordered for home    Consulta LORazepam 1 MG Tabs  Commonly known as:  ATIVAN      Take 1 mg by mouth every 6 (six) hours as needed. Refills:  5     metoprolol Tartrate 25 MG Tabs  Commonly known as:  LOPRESSOR      Take 25 mg by mouth daily.    Refills:  0     Morphine Sulfate (Con

## 2019-06-30 NOTE — PLAN OF CARE
Patient has remained free from falls throughout stay. Hourly rounding maintained. Pt's bed in lowest position w/ side rails up. Patient has been educated and is compliant w/ call light system. Akira's Bg is controlled.  Patient has been educated on hand wa pain and pain management  - Manage/alleviate anxiety  - Utilize distraction and/or relaxation techniques  - Monitor for opioid side effects  - Notify MD/LIP if interventions unsuccessful or patient reports new pain  - Anticipate increased pain with activit based on physician/LIP order or complex needs related to functional status, cognitive ability or social support system  Outcome: Progressing     Problem: Patient Centered Care  Goal: Patient preferences are identified and integrated in the patient's plan o

## 2019-08-14 PROBLEM — Z98.890 S/P HARDWARE REMOVAL: Status: ACTIVE | Noted: 2019-01-01

## 2019-08-14 PROBLEM — M21.70 ACQUIRED LEG LENGTH DISCREPANCY: Status: ACTIVE | Noted: 2019-01-01

## 2020-12-24 NOTE — PROGRESS NOTES
[facility]    Progress Note    Lyly Bar Patient Status:  Inpatient    10/19/1935 MRN [id number]   Location [facility] 4W/SW/SE Attending Javier Soni MD   Hosp Day # 3 PCP Ana Luisa Mcginnis MD     SUBJECTIVE:  Latricia Sanchez none

## (undated) DEVICE — 3M™ MEDITPORE™ SOFT CLOTH TAPE 6 IN X 10 YD 12 ROLLS/CASE 2966: Brand: 3M™ MEDIPORE™

## (undated) DEVICE — SOL  .9 1000ML BTL

## (undated) DEVICE — CONTAINER SPEC STR 4OZ GRY LID

## (undated) DEVICE — ENCORE® LATEX ACCLAIM SIZE 8.5, STERILE LATEX POWDER-FREE SURGICAL GLOVE: Brand: ENCORE

## (undated) DEVICE — TOTAL HIP CDS: Brand: MEDLINE INDUSTRIES, INC.

## (undated) DEVICE — 3M™ STERI-DRAPE™ INSTRUMENT POUCH 1018: Brand: STERI-DRAPE™

## (undated) DEVICE — SUTURE VICRYL 0 CP-1

## (undated) DEVICE — GEL AQUASONIC 100 20GR

## (undated) DEVICE — FAN SPRAY KIT: Brand: PULSAVAC®

## (undated) DEVICE — 2C14 #2 PDO 45 X 45: Brand: 2C14 #2 PDO 45 X 45

## (undated) DEVICE — KENDALL SCD EXPRESS SLEEVES, KNEE LENGTH, MEDIUM: Brand: KENDALL SCD

## (undated) DEVICE — 3.2MM X 18.3MM METAL CUTTING HELIOCOIDAL RASP

## (undated) DEVICE — DRESSING AQUACEL AG 3.5 X 10

## (undated) DEVICE — CHLORAPREP ORANGE TINT 10.5ML

## (undated) DEVICE — SPLINT ORTH UNV HIP PD CUP LG

## (undated) DEVICE — ENCORE® LATEX MICRO SIZE 7.5, STERILE LATEX POWDER-FREE SURGICAL GLOVE: Brand: ENCORE

## (undated) DEVICE — GAMMEX® PI HYBRID SIZE 8.5, STERILE POWDER-FREE SURGICAL GLOVE, POLYISOPRENE AND NEOPRENE BLEND: Brand: GAMMEX

## (undated) DEVICE — 3M™ STERI-DRAPE™ U-DRAPE 1015: Brand: STERI-DRAPE™

## (undated) DEVICE — Device: Brand: STABLECUT®

## (undated) DEVICE — SUTURE ETHIBOND 1 OS-6

## (undated) DEVICE — DRAPE,U/SHT,SPLIT,FILM,60X84,STERILE: Brand: MEDLINE

## (undated) DEVICE — 60 ML SYRINGE LUER-LOCK TIP: Brand: MONOJECT

## (undated) DEVICE — BLADE SAW SAGITTAL 19.5

## (undated) DEVICE — SUTURE VICRYL 2-0 FSL

## (undated) DEVICE — BONE WAX W31G

## (undated) DEVICE — CHLORAPREP 26ML APPLICATOR

## (undated) DEVICE — SOL  .9 3000ML

## (undated) DEVICE — GOWN,SIRUS,FABRIC-REINFORCED,LARGE: Brand: MEDLINE

## (undated) DEVICE — STOCKING CMPR LG LNG THG LGTH

## (undated) DEVICE — 1010 S-DRAPE TOWEL DRAPE 10/BX: Brand: STERI-DRAPE™

## (undated) DEVICE — SUTURE VICRYL 2-0 CP-1

## (undated) DEVICE — PACK CDS TOTAL HIP

## (undated) DEVICE — SUTURE VLOC 90 3-0 9\" 2044

## (undated) DEVICE — DRESSING AQUACEL AG 3.5X12

## (undated) DEVICE — FRAZIER SUCTION INSTRUMENT 12 FR W/CONTROL VENT & OBTURATOR: Brand: FRAZIER

## (undated) DEVICE — LAPAROTOMY SPONGE - RF AND X-RAY DETECTABLE PRE-WASHED: Brand: SITUATE

## (undated) DEVICE — PREMIUM WET SKIN PREP TRAY: Brand: MEDLINE INDUSTRIES, INC.

## (undated) DEVICE — ENCORE® LATEX MICRO SIZE 8, STERILE LATEX POWDER-FREE SURGICAL GLOVE: Brand: ENCORE

## (undated) DEVICE — STOCKINETTE HYDROMED 8X6

## (undated) DEVICE — MLPD DISPOSABLE PAD (6' ROLL) 3 ROLLS: Brand: SCHAERER MEDICAL USA

## (undated) DEVICE — GLOVE SURG SENSICARE SZ 8-1/2

## (undated) DEVICE — STERILE POLYISOPRENE POWDER-FREE SURGICAL GLOVES: Brand: PROTEXIS

## (undated) DEVICE — T5 HOOD WITH PEEL AWAY FACE SHIELD

## (undated) DEVICE — SUTURE ETHIBOND 5-0 MB46G

## (undated) DEVICE — DRESSING 10X4IN ANMC SAFETAC

## (undated) DEVICE — Device: Brand: NCB®

## (undated) DEVICE — HOOD, PEEL-AWAY: Brand: FLYTE

## (undated) DEVICE — REM POLYHESIVE ADULT PATIENT RETURN ELECTRODE: Brand: VALLEYLAB

## (undated) DEVICE — 3M™ COBAN™ NL STERILE NON-LATEX SELF-ADHERENT WRAP, 2084S, 4 IN X 5 YD (10 CM X 4,5 M), 18 ROLLS/CASE: Brand: 3M™ COBAN™

## (undated) DEVICE — SUPER SPONGES,MEDIUM: Brand: KERLIX

## (undated) DEVICE — SUTURE ETHIBOND 2 V-37

## (undated) DEVICE — BANDAGE ESMARK 6IN W/O LINER

## (undated) DEVICE — NEEDLE SPINAL 20X3-1/2 YELLOW

## (undated) DEVICE — SHEET,DRAPE,70X100,STERILE: Brand: MEDLINE

## (undated) DEVICE — DRAPE SRG 70X60IN SPLT U IMPRV

## (undated) DEVICE — BATTERY

## (undated) DEVICE — HEWSON SUTURE RETRIEVER: Brand: HEWSON SUTURE RETRIEVER

## (undated) DEVICE — SUTURE ETHIBOND 1 CT-1

## (undated) NOTE — IP AVS SNAPSHOT
Patient Demographics     Address  [address] [place], [place] [address] [address] Phone  [phone] [facility])  [phone] (Mobile) *Preferred* E-mail Address  [email or web address]      Emergency Contact(s)     Name Relation Home Work Mobile    Jarred Licea Commonly known as:  CASODEX      TAKE 1 TABLET BY MOUTH DAILY. Kimberly Nunez MD         bisacodyl 5 MG Tbec  Commonly known as:  DULCOLAX      Take 2 tablets by mouth daily as needed for constipation.    Vernon Quinones MD         CALCIUM 1000 + D OR Take 1 tablet (10 mg total) by mouth daily. Rogelio Olivas MD         tamsulosin HCl 0.4 MG Caps  Commonly known as:  FLOMAX      Take 1 capsule (0.4 mg total) by mouth once daily.    Rogelio Olivas MD                  [phone] - MAR ACTION REPORT  (last SpO2  97 % Filed at 10/20/2017 1530      Patient's Most Recent Weight    Flowsheet Row Most Recent Value   Patient Weight  99.8 kg (220 lb)         Lab Results Last 24 Hours      HEMOGLOBIN [[id number]] (Abnormal)  Resulted: 10/20/17 1431, Result status: Fi Pt seen and examined 10/15/2017    Patient presents with:  Trauma (cardiovascular, musculoskeletal)  Fall (musculoskeletal, neurologic)       PCP: Oneil Doll MD    History of Present Illness: Patient is a 80year old male with PMH sig for[CY. 1] GERD, HT • Type II or unspecified type diabetes mellitus without mention of complication, not stated as uncontrolled    • Unspecified essential hypertension    • Unspecified vitamin D deficiency 1/19/2009      Past Surgical History:  1/29/10: ADJ TISS XFER HEAD,FAC Contrast Dye [Gadol*    Itching       fentaNYL 1 patch Q72H   Loteprednol-Tobramycin 2 drop 6x Daily   [START ON 10/16/2017] Pantoprazole Sodium 20 mg QAM AC   Oxybutynin Chloride ER 10 mg Daily   [START ON 10/16/2017] Metoprolol Succinate ER 50 mg Daily B unwittnessed fall  PATIENT STATED HISTORY: (As transcribed by Technologist)  Patient offered no additional history at this time.     FINDINGS:  BONES:  There is a periprosthetic fracture along the mid shaft of the right femur at the level of the distal tips loss of the caudal aspect of both frontal lobes. Sequelae of chronic small vessel ischemic disease is noted. No evidence of intracranial hemorrhage or extra-axial fluid collection.    Dictated by: Lynn Oppenheim, MD on 10/15/2017 at 16:22     Approved by 80year old male with PMH sig for[CY. 1] GERD, HTN, HL, DM, who p/t [facility] ED post fall at home and has femur fx.     Fall  -unclear, mechanical? Wife reports more confused  -CT head done, chronic changes noted    Femur fx  -ortho c/s, plan for surgery  -CMP, CB Fall (musculoskeletal, neurologic)       PCP: Shy Balderrama MD    History of Present Illness: Patient is a 80year old male with PMH sig for[CY. 1] GERD, HTN, HL, DM, who p/t [facility] ED post fall at home and has femur fx.     Pt seen with wife at bedside who cont • Unspecified essential hypertension    • Unspecified vitamin D deficiency 1/19/2009      Past Surgical History:  1/29/10: ADJ TISS XFER HEAD,FAC,HAND <10SQCM      Comment: Performed by Horacio Vieyra at [facility]  No date [START ON 10/16/2017] Pantoprazole Sodium 20 mg QAM AC   Oxybutynin Chloride ER 10 mg Daily   [START ON 10/16/2017] Metoprolol Succinate ER 50 mg Daily Beta Blocker   Nortriptyline HCl 25 mg Nightly   gabapentin 600 mg BID   [START ON 10/16/2017] Alfuzosin Technologist)  Patient offered no additional history at this time. FINDINGS:  BONES:  There is a periprosthetic fracture along the mid shaft of the right femur at the level of the distal tips of the femoral stem of the right hip prosthesis.  There is sup vessel ischemic disease is noted. No evidence of intracranial hemorrhage or extra-axial fluid collection.    Dictated by: Heidi Oscar MD on 10/15/2017 at 16:22     Approved by: Heidi Oscar MD            Xr Chest Ap Portable  (cpt=71010)    Res post fall at home and has femur fx.     Fall  -unclear, mechanical? Wife reports more confused  -CT head done, chronic changes noted    Femur fx  -ortho c/s, plan for surgery  -CMP, CBC, UA ok  -MSSA on screen  -CXR clear  -EKG sinus jose armando  -medically ok fo HISTORY OF PRESENT ILLNESS:  This is a 30-year-old male who presented to [facility] Emergency Room with right periprosthetic femur fracture. The patient reported he was sweeping the floor when he fell. His fall was unwitnessed.   He denied dizziness, HEART:  Normal S1, S2, without murmur. ABDOMEN:  Benign. EXTREMITIES:  Significant for painful right lower extremity, which is swollen and deformed. IMPRESSION:  Right periprosthetic femur fracture.     PLAN:  Open reduction with internal fixation of r Risk of readmission: Siva Hoyt has Moderate Risk of readmission after discharge from the hospital.    Important Follow up:     Daniel Damian MD  [address] [address] [phone]    Schedule an appointment as soon a CONCLUSION:  1. Periprosthetic fracture along the mid shaft of the right femur at the level of the distal tip of the femoral stem of the right hip prosthesis. There is superior and medial displacement of the distal fracture fragment. Dictated by:  Matilda Shaw, (CPT=[id number]), 9/04/2015, 23:04. INDICATIONS:  unwittnessed fall  PATIENT STATED HISTORY: (As transcribed by Technologist)  Patient offered no additional history at this time. FINDINGS:  The lungs are clear.   Cardiomediastinal silhouette and vascularity involving the mid right femoral diaphysis.     Dictated by: Dorothea Mendez MD on 10/16/2017 at 23:21     Approved by: Dorothea Mendez MD               Operative Procedures: Procedure(s) (LRB):  FEMUR OPEN REDUCTION INTERNAL FIXATION/ EX FIX (Right)       Deyanira Ford Take 2 tablets by mouth daily as needed for constipation. FLUZONE HIGH-DOSE Intramuscular Suspension      ACCU-CHEK FASTCLIX LANCETS Does not apply Misc  Test BGs pre and 2 hrs post meal 2X/day    !! - Potential duplicate medications found.  Please discu evidence of intracranial hemorrhage or extra-axial fluid collection.     Therapy significant co-morbidities:  GERD, HTN, HL, DM, prostate CA (previous home hospice), neuropathy, and hx of 3 hip sx; hx of freq falls    Problem List  Principal Problem:    Cl 1/29/10: ADJ TISS XFER HEAD,FAC,HAND <10SQCM      Comment: Performed by Austin Flores at [facility]  No date: CATARACT  No date: CHOLECYSTECTOMY  7/9/2004: COLONOSCOPY      Comment: UGI 4/16/07 8/5/2014: Lamont Black R Lower Extremity: Toe Touch Weight Bearing       PAIN ASSESSMENT   Rating: Unable to rate  Location: R hip  Management Techniques: Activity promotion; Body mechanics;Repositioning    BALANCE pt's left foot to encourage TTWB. Mod/max assist of 2 up to stand. Pt maintains TTBW briefly, but with increasing time begins to bear more weight. Returned to sit with mod assist of 2. Returns to supine with mod/max assist of 2.  Positioned in bed for comfo Goal Comments: Goals established on 10/17/2017[MD.1]       Attribution Guzman    MD.1 - Ike Sloan PT on 10/18/2017 11:26 AM               Physical Therapy Note signed by Ike Sloan PT at 10/18/2017 11:36 AM  Version 1 of 2    Author:  Ike Sloan PT • Hyperglycemia 6/8/2012   • Myopathy     saw Dr. Lorenza Alcala 12/29/10    • Nonspecific elevation of levels of transaminase or lactic acid dehydrogenase (LDH) 12/11/2008   • Osteoarthrosis, unspecified whether generalized or localized, unspecified site    • Othe 6/15/10: NASAL SCOPY,REMV TOTL ETHMOID      Comment: Performed by Shanthi Blanc at [address]  2007: OTHER SURGICAL HISTORY      Comment: External Beam Radiation of the prostate  8/13/2010: [address] [place] [address],  [address] -   Moving from lying on back to sitting on the side of the bed?: A Lot   How much help from another person does the patient currently need. ..   -   Moving to and from a bed to a chair (including a wheelchair)?: Total   -   Need to walk in hospital room?: mobility has improved since session with PT on 10/17/19. Continue to recommend total lift for transfers to chair by [facility] staff.  Pt continues to  present with the following impairments posture, endurance, force generating capacity, strength, gait and balanc 10/15/2017 from home with c/o R hip pn.  Pt is s/p closed R hip fx c ORIF 10/16/17.       Therapy significant imaging (date, test, result):  CT of brain 10/15/17-IMPRESSION:  There has been interval mild loss of the caudal aspect of both frontal lobes.  Seq • Sensorineural hearing loss, unspecified 8/22/2008   • Type II or unspecified type diabetes mellitus without mention of complication, not stated as uncontrolled    • Unspecified essential hypertension    • Unspecified vitamin D deficiency 1/19/2009[MD.2] 2004: SPECIAL SERVICE OR REPORT      Comment: Radical prostatectomy  No date: TONSILLECTOMY[MD.2]    SUBJECTIVE[MD.1]  \"I'll try\"[MD.3]    Patient’s self-stated goal is[MD.1] decrease pain. [MD.3]    OBJECTIVE[MD.1]  Precautions: Hard of hearing; Other (Co Comment : pt unable to maintain TTWB status for gait[MD.2]    Skilled Therapy Provided:[MD.1] Pt presents semi-reclined in bed, agreeable to PT session. C/o of pain with movement of left hip for there-ex.  Pt completes supine to sit with draw sheet assist w mobility. Continue to recommend ALYSHA upon d/c from [facility]. [MD.4]     DISCHARGE RECOMMENDATIONS[MD.1]  PT Discharge Recommendations: Sub-acute rehabilitation; Other (Comment) (daisha STEIN 19-21 days)[MD.2]     PLAN[MD.1]  PT Treatment Plan: Bed mobility; Body mechanics Denosumab, INJ 02/15/16     Denosumab, INJ 01/18/16     Denosumab, INJ 12/14/15     Denosumab, INJ 11/16/15     Denosumab, INJ 10/19/15     Denosumab, INJ 09/21/15     Denosumab, INJ 08/24/15     Denosumab, INJ 07/27/15     Denosumab, INJ 06/26/15     Den

## (undated) NOTE — LETTER
[facility]  [address] [facility], [address] [place] [address]    Consent for Operation    Date: __________________    Time: _______________    1.  I authorize the performance upon Uriel Law the following operation:    Procedure(s):  Open reduction procedure has been videotaped, the surgeon will obtain the original videotape. The hospital will not be responsible for storage or maintenance of this tape.     6. For the purpose of advancing medical education, I consent to the admittance of observers to t STATEMENTS REQUIRING INSERTION OR COMPLETION WERE FILLED IN.     Signature of Patient:   ___________________________    When the patient is a minor or mentally incompetent to give consent:  Signature of person authorized to consent for patient: ____________ supplements, and pills I can buy without a prescription (including street drugs/illegal medications). Failure to inform my anesthesiologist about these medicines may increase my risk of anesthetic complications.   · If I am allergic to anything or have had Anesthesiologist Signature     Date   Time  I have discussed the procedure and information above with the patient (or patient’s representative) and answered their questions. The patient or their representative has agreed to have anesthesia services.     ___

## (undated) NOTE — ED AVS SNAPSHOT
Karthik Pham   MRN: [id number]    Department:  [facility] Emergency Department   Date of Visit:  2/24/2018           Disclosure     Insurance plans vary and the physician(s) referred by the ER may not be covered by your plan.  Please conta tell this physician (or your personal doctor if your instructions are to return to your personal doctor) about any new or lasting problems. The primary care or specialist physician will see patients referred from the [facility] Emergency Department.  Arthor Bernheim

## (undated) NOTE — LETTER
[facility]  Ubaldo Faulkner 63 [id number] [facility], [address] [place] [address]    Consent for Operation    Date: __________________    Time: _______________    1. I authorize the performance upon Shiraz Swain the following operation:    Procedure(s):  O.R.I. F.  perip revealed by the pictures or by descriptive texts accompanying them. If the procedure has been videotaped, the surgeon will obtain the original videotape. The hospital will not be responsible for storage or maintenance of this tape.     6. For the purpose of THAT MY DOCTOR PROVIDED ME WITH THE ABOVE EXPLANATIONS, THAT ALL BLANKS OR STATEMENTS REQUIRING INSERTION OR COMPLETION WERE FILLED IN.     Signature of Patient:   ___________________________    When the patient is a minor or mentally incompetent to give co · All of the medicines I take (including prescriptions, herbal supplements, and pills I can buy without a prescription (including street drugs/illegal medications).  Failure to inform my anesthesiologist about these medicines may increase my risk of anesthe _____________________________________________________________________________  Anesthesiologist Signature     Date   Time  I have discussed the procedure and information above with the patient (or patient’s representative) and answered their questions.  The

## (undated) NOTE — LETTER
[facility]  Ubaldo Faulkner 63 [address] [facility], [address] [place] [address]    Consent for Operation    Date: __________________    Time: _______________    1. I authorize the performance upon Carlos Brady the following operation:    Procedure(s):  O.R.I. F.  perip videotape. The \[facility]\"" will not be responsible for storage or maintenance of this tape. 6. For the purpose of advancing medical education, I consent to the admittance of observers to the Operating Room.     7. I authorize the use of any specimen, organs Signature of Patient:   ___________________________    When the patient is a minor or mentally incompetent to give consent:  Signature of person authorized to consent for patient: ___________________________   Relationship to patient: _____________________ drugs/illegal medications). Failure to inform my anesthesiologist about these medicines may increase my risk of anesthetic complications. · If I am allergic to anything or have had a reaction to anesthesia before.     3. I understand how the anesthesia med I have discussed the procedure and information above with the patient (or patient’s representative) and answered their questions. The patient or their representative has agreed to have anesthesia services.     _______________________________________________

## (undated) NOTE — ED AVS SNAPSHOT
Luci Kunz   MRN: [id number]    Department:  [facility] Emergency Department   Date of Visit:  6/15/2019           Disclosure     Insurance plans vary and the physician(s) referred by the ER may not be covered by your plan.  Please contact yo tell this physician (or your personal doctor if your instructions are to return to your personal doctor) about any new or lasting problems. The primary care or specialist physician will see patients referred from the [facility] Emergency Department.  Mikey Tavarez